# Patient Record
Sex: FEMALE | Race: WHITE | NOT HISPANIC OR LATINO | Employment: OTHER | URBAN - METROPOLITAN AREA
[De-identification: names, ages, dates, MRNs, and addresses within clinical notes are randomized per-mention and may not be internally consistent; named-entity substitution may affect disease eponyms.]

---

## 2017-05-03 ENCOUNTER — TRANSFERRED RECORDS (OUTPATIENT)
Dept: HEALTH INFORMATION MANAGEMENT | Facility: CLINIC | Age: 77
End: 2017-05-03

## 2017-05-10 DIAGNOSIS — K21.00 REFLUX ESOPHAGITIS: ICD-10-CM

## 2017-05-10 NOTE — TELEPHONE ENCOUNTER
omeprazole (PRILOSEC) 20 MG capsule 90 capsule 1 11/22/2016  No   Sig: Take 1 capsule (20 mg) by mouth daily           Last Written Prescription Date: 11/22/2016  Last Fill Quantity: 90,  # refills: 1   Last Office Visit with [facility], [facility] or [facility] prescribing provider: 05/17/2016 , no future appt.  I did not send letter.

## 2017-05-10 NOTE — TELEPHONE ENCOUNTER
Routing to TC pool, Pt is due for OV with PCP for annual physical with labs. Please assist them in scheduling this, then route back to refill pool. Thanks.     Cori Thompson, RN

## 2017-05-24 NOTE — TELEPHONE ENCOUNTER
Prescription approved per [facility] Refill Protocol.  Esperanza Corrales RN

## 2017-06-20 ENCOUNTER — TELEPHONE (OUTPATIENT)
Dept: FAMILY MEDICINE | Facility: CLINIC | Age: 77
End: 2017-06-20

## 2017-06-20 NOTE — TELEPHONE ENCOUNTER
Reason for Call:  Other     Detailed comments: Patient wants to go get a shot at pharmacy for pneumonia,   but they are asking which one.  Also questions about shingles shot    Phone Number Patient can be reached at: Home number on file [phone] (home)    Best Time: anytime    Can we leave a detailed message on this number? YES    Call taken on 6/20/2017 at 10:52 AM by Monica Elder

## 2017-09-05 ENCOUNTER — TRANSFERRED RECORDS (OUTPATIENT)
Dept: HEALTH INFORMATION MANAGEMENT | Facility: CLINIC | Age: 77
End: 2017-09-05

## 2017-09-26 ENCOUNTER — TRANSFERRED RECORDS (OUTPATIENT)
Dept: HEALTH INFORMATION MANAGEMENT | Facility: CLINIC | Age: 77
End: 2017-09-26

## 2017-11-02 ENCOUNTER — TRANSFERRED RECORDS (OUTPATIENT)
Dept: HEALTH INFORMATION MANAGEMENT | Facility: CLINIC | Age: 77
End: 2017-11-02

## 2017-11-02 LAB
CHOLEST SERPL-MCNC: 141 MG/DL (ref 0–200)
HDLC SERPL-MCNC: 72 MG/DL (ref 40–60)
LDLC SERPL CALC-MCNC: 44 MG/DL
TRIGL SERPL-MCNC: 127 MG/DL

## 2017-11-03 ENCOUNTER — DOCUMENTATION ONLY (OUTPATIENT)
Dept: LAB | Facility: CLINIC | Age: 77
End: 2017-11-03

## 2017-11-03 DIAGNOSIS — E78.5 HYPERLIPIDEMIA LDL GOAL <100: ICD-10-CM

## 2017-11-03 DIAGNOSIS — Z00.00 ROUTINE GENERAL MEDICAL EXAMINATION AT A HEALTH CARE FACILITY: Primary | ICD-10-CM

## 2017-11-03 DIAGNOSIS — I25.10 ASCVD (ARTERIOSCLEROTIC CARDIOVASCULAR DISEASE): ICD-10-CM

## 2017-11-03 DIAGNOSIS — R73.9 ELEVATED BLOOD SUGAR: ICD-10-CM

## 2017-11-03 NOTE — PROGRESS NOTES
"Patient is coming in for px labs on 11/10/17. Appointment notes state it is for \"fasting labs and A1c but no cholesterol\". Please review and place future labs that are needed at that time.     Thank you.  "

## 2017-11-08 DIAGNOSIS — R73.9 ELEVATED BLOOD SUGAR: ICD-10-CM

## 2017-11-08 DIAGNOSIS — I25.10 ASCVD (ARTERIOSCLEROTIC CARDIOVASCULAR DISEASE): ICD-10-CM

## 2017-11-08 DIAGNOSIS — K21.00 REFLUX ESOPHAGITIS: ICD-10-CM

## 2017-11-08 DIAGNOSIS — E78.5 HYPERLIPIDEMIA LDL GOAL <100: ICD-10-CM

## 2017-11-08 DIAGNOSIS — Z00.00 ROUTINE GENERAL MEDICAL EXAMINATION AT A HEALTH CARE FACILITY: ICD-10-CM

## 2017-11-08 LAB
ALBUMIN SERPL-MCNC: 4.3 G/DL (ref 3.4–5)
ALP SERPL-CCNC: 90 U/L (ref 40–150)
ALT SERPL W P-5'-P-CCNC: 24 U/L (ref 0–50)
ANION GAP SERPL CALCULATED.3IONS-SCNC: 12 MMOL/L (ref 3–14)
AST SERPL W P-5'-P-CCNC: 22 U/L (ref 0–45)
BILIRUB SERPL-MCNC: 0.6 MG/DL (ref 0.2–1.3)
BUN SERPL-MCNC: 17 MG/DL (ref 7–30)
CALCIUM SERPL-MCNC: 9.7 MG/DL (ref 8.5–10.1)
CHLORIDE SERPL-SCNC: 105 MMOL/L (ref 94–109)
CO2 SERPL-SCNC: 24 MMOL/L (ref 20–32)
CREAT SERPL-MCNC: 0.9 MG/DL (ref 0.52–1.04)
ERYTHROCYTE [DISTWIDTH] IN BLOOD BY AUTOMATED COUNT: 13.4 % (ref 10–15)
GFR SERPL CREATININE-BSD FRML MDRD: 61 ML/MIN/1.7M2
GLUCOSE SERPL-MCNC: 101 MG/DL (ref 70–99)
HBA1C MFR BLD: 5.5 % (ref 4.3–6)
HCT VFR BLD AUTO: 45.2 % (ref 35–47)
HGB BLD-MCNC: 14.9 G/DL (ref 11.7–15.7)
MCH RBC QN AUTO: 32.8 PG (ref 26.5–33)
MCHC RBC AUTO-ENTMCNC: 33 G/DL (ref 31.5–36.5)
MCV RBC AUTO: 100 FL (ref 78–100)
PLATELET # BLD AUTO: 200 10E9/L (ref 150–450)
POTASSIUM SERPL-SCNC: 4.1 MMOL/L (ref 3.4–5.3)
PROT SERPL-MCNC: 7.7 G/DL (ref 6.8–8.8)
RBC # BLD AUTO: 4.54 10E12/L (ref 3.8–5.2)
SODIUM SERPL-SCNC: 141 MMOL/L (ref 133–144)
WBC # BLD AUTO: 4.3 10E9/L (ref 4–11)

## 2017-11-08 PROCEDURE — 85027 COMPLETE CBC AUTOMATED: CPT | Performed by: INTERNAL MEDICINE

## 2017-11-08 PROCEDURE — 83036 HEMOGLOBIN GLYCOSYLATED A1C: CPT | Performed by: INTERNAL MEDICINE

## 2017-11-08 PROCEDURE — 36415 COLL VENOUS BLD VENIPUNCTURE: CPT | Performed by: INTERNAL MEDICINE

## 2017-11-08 PROCEDURE — 80053 COMPREHEN METABOLIC PANEL: CPT | Performed by: INTERNAL MEDICINE

## 2017-11-16 ENCOUNTER — TRANSFERRED RECORDS (OUTPATIENT)
Dept: HEALTH INFORMATION MANAGEMENT | Facility: CLINIC | Age: 77
End: 2017-11-16

## 2017-11-20 NOTE — PROGRESS NOTES
SUBJECTIVE:   Estefania Meyer is a 77 year old female who presents for Preventive Visit.    The patient is doing super, works out reg, just seen by cards and est neg per patient, lipids super as noted.  Up to date with gyn, doing dexa in [place].  No c/o               Past Medical History:      Past Medical History:   Diagnosis Date     ASCVD (arteriosclerotic cardiovascular disease) 2010    atyp cp then angio done anw and 4 stents     Elevated blood sugar      H/O colonoscopy 2005, 2013    nl     Microhematuria 2014    Dr. Rico, cysto and renal us neg     Osteoporosis 2001    by dexa, fu done 2004 and unchanged, fu done [place]     Reflux esophagitis 2004    on ugi, then egd 2009 with hh and esophagitis     Tremor     right hand for years             Past Surgical History:      Past Surgical History:   Procedure Laterality Date     CATARACT IOL, RT/LT  2016     COSMETIC SURGERY  1994    chin     EXTERNAL EAR SURGERY      young     left arm surgery Left 2014     MASTECTOMY SIMPLE BILATERAL  1982    done due to fh, had leakage and replaced 2004     TUBAL LIGATION  1972             Social History:     Social History     Social History     Marital status:      Spouse name: N/A     Number of children: 3     Years of education: N/A     Occupational History     Not on file.     Social History Main Topics     Smoking status: Never Smoker     Smokeless tobacco: Never Used     Alcohol use No     Drug use: No     Sexual activity: Yes     Partners: Male     Other Topics Concern     Not on file     Social History Narrative             Family History:   reviewed         Allergies:     Allergies   Allergen Reactions     Codeine Sulfate Nausea     Penicillin V Nausea and Vomiting     Sulfa Drugs Nausea and Vomiting             Medications:     Current Outpatient Prescriptions   Medication Sig Dispense Refill     omeprazole (PRILOSEC) 20 MG CR capsule TAKE 1 CAPSULE BY MOUTH  DAILY 90 capsule 3     ranitidine (ZANTAC)  150 MG tablet Take 1 tablet (150 mg) by mouth 2 times daily 180 tablet 11     D3-50 17566 UNITS capsule TAKE 1 CAPSULE BY MOUTH EVERY 30 DAYS. 12 capsule 1     rosuvastatin (CRESTOR) 5 MG tablet Take by mouth daily       aspirin 325 MG tablet Take by mouth daily                 Review of Systems:   The 10 point Review of Systems is negative other than noted in the HPI           Physical Exam:   Blood pressure 136/82, pulse 87, temperature 97.6  F (36.4  C), temperature source Oral, height 5' (1.524 m), weight 120 lb 14.4 oz (54.8 kg), SpO2 100 %, not currently breastfeeding.    Exam:  Constitutional: healthy appearing, alert and in no distress  Heent: Normocephalic. Head without obvious masses or lesions. PERRLDC, EOMI. Mouth exam within normal limits: tongue, mucous membranes, posterior pharynx all normal, no lesions or abnormalities seen.  Tm's and canals within normal limits bilaterally. Neck supple, no nuchal rigidity or masses. No supraclavicular, or cervical adenopathy. Thyroid symmetric, no masses.  Cardiovascular: Regular rate and rhythm, no murmer, rub or gallops.  JVP not elevated, no edema.  Carotids within normal limits bilaterally, no bruits.  Respiratory: Normal respiratory effort.  Lungs clear, normal flow, no wheezing or crackles.  Gastrointestinal: Normal active bowel sounds.   Soft, not tender, no masses, guarding or rebound.  No hepatosplenomegaly.   Musculoskeletal: extremities normal, no gross deformities noted.  Skin: no suspicious lesions or rashes   Neurologic: Mental status within normal limits.  Speech fluent.  No gross motor abnormalities and gait intact.  Psychiatric: mentation appears normal and affect normal.         Data:   Labs reviewed with patient         Assessment:   1. Normal complete physical exam  2. Ascvd, stable  3. Gerd, to try zantac, if not effective back to ppi  4. Elevated cholesterol on statin  5. Osteopor, follow up in [place]  6. Elevated sugar, stable  7. hcm          Plan:   prevnar  Exercise, diet  Call if problems      Boston Majano M.D.                    Are you in the first 12 months of your Medicare Part B coverage?  No    Healthy Habits:    Do you get at least three servings of calcium containing foods daily (dairy, green leafy vegetables, etc.)? no, taking calcium and/or vitamin D supplement: yes -     Amount of exercise or daily activities, outside of work: 5 day(s) per week    Problems taking medications regularly No    Medication side effects: No    Have you had an eye exam in the past two years? yes    Do you see a dentist twice per year? yes    Do you have sleep apnea, excessive snoring or daytime drowsiness?no                Reviewed and updated as needed this visit by clinical staff         Reviewed and updated as needed this visit by Provider        Social History   Substance Use Topics     Smoking status: Never Smoker     Smokeless tobacco: Never Used     Alcohol use No       The patient does not drink >3 drinks per day nor >7 drinks per week.     Today's PHQ-2 Score:   PHQ-2 ( 1999 Pfizer) 5/17/2016 11/12/2013   Q1: Little interest or pleasure in doing things 0 0   Q2: Feeling down, depressed or hopeless 0 0   PHQ-2 Score 0 0         Do you feel safe in your environment - Yes    Do you have a Health Care Directive?: Yes: Patient states has Advance Directive and will bring in a copy to clinic.    Current providers sharing in care for this patient include: Patient Care Team:  Boston Majano MD as PCP - General (Internal Medicine)      Hearing impairment: No    Ability to successfully perform activities of daily living: Yes, no assistance needed     Fall risk:         Home safety:  none identified      The following health maintenance items are reviewed in Epic and correct as of today:Health Maintenance   Topic Date Due     ADVANCE DIRECTIVE PLANNING Q5 YRS  07/02/1997     DEXA SCAN SCREENING (SYSTEM ASSIGNED)  07/02/2007     PNEUMOCOCCAL (2 of 2 - PCV13)  09/29/2010     FALL RISK ASSESSMENT  05/17/2017     INFLUENZA VACCINE (SYSTEM ASSIGNED)  09/01/2017     LIPID SCREEN Q5 YR FEMALE (SYSTEM ASSIGNED)  10/18/2020     TETANUS IMMUNIZATION (SYSTEM ASSIGNED)  11/12/2023     COLON CANCER SCREEN (SYSTEM ASSIGNED)  11/21/2023             End of Life Planning:  Patient currently has an advanced directive: yes    COUNSELING:  Reviewed preventive health counseling, as reflected in patient instructions       Regular exercise       Healthy diet/nutrition        Estimated body mass index is 24.9 kg/(m^2) as calculated from the following:    Height as of 5/17/16: 5' (1.524 m).    Weight as of 5/17/16: 127 lb 8 oz (57.8 kg).     reports that she has never smoked. She has never used smokeless tobacco.        Appropriate preventive services were discussed with this patient, including applicable screening as appropriate for cardiovascular disease, diabetes, osteopenia/osteoporosis, and glaucoma.  As appropriate for age/gender, discussed screening for colorectal cancer, prostate cancer, breast cancer, and cervical cancer. Checklist reviewing preventive services available has been given to the patient.    Reviewed patients plan of care and provided an AVS. The Basic Care Plan (routine screening as documented in Health Maintenance) for Estefania meets the Care Plan requirement. This Care Plan has been established and reviewed with the Patient.    Counseling Resources:  ATP IV Guidelines  Pooled Cohorts Equation Calculator  Breast Cancer Risk Calculator  FRAX Risk Assessment  ICSI Preventive Guidelines  Dietary Guidelines for Americans, 2010  USDA's MyPlate  ASA Prophylaxis  Lung CA Screening    Boston Majano MD  [facility]

## 2017-11-21 ENCOUNTER — OFFICE VISIT (OUTPATIENT)
Dept: FAMILY MEDICINE | Facility: CLINIC | Age: 77
End: 2017-11-21
Payer: MEDICARE

## 2017-11-21 VITALS
DIASTOLIC BLOOD PRESSURE: 82 MMHG | BODY MASS INDEX: 23.74 KG/M2 | HEIGHT: 60 IN | HEART RATE: 87 BPM | OXYGEN SATURATION: 100 % | SYSTOLIC BLOOD PRESSURE: 136 MMHG | TEMPERATURE: 97.6 F | WEIGHT: 120.9 LBS

## 2017-11-21 DIAGNOSIS — K21.00 REFLUX ESOPHAGITIS: ICD-10-CM

## 2017-11-21 DIAGNOSIS — R73.9 ELEVATED BLOOD SUGAR: ICD-10-CM

## 2017-11-21 DIAGNOSIS — M81.0 OSTEOPOROSIS, UNSPECIFIED OSTEOPOROSIS TYPE, UNSPECIFIED PATHOLOGICAL FRACTURE PRESENCE: ICD-10-CM

## 2017-11-21 DIAGNOSIS — Z23 NEED FOR VACCINATION: ICD-10-CM

## 2017-11-21 DIAGNOSIS — E78.5 HYPERLIPIDEMIA LDL GOAL <100: ICD-10-CM

## 2017-11-21 DIAGNOSIS — I25.10 ASCVD (ARTERIOSCLEROTIC CARDIOVASCULAR DISEASE): ICD-10-CM

## 2017-11-21 DIAGNOSIS — Z00.00 ROUTINE GENERAL MEDICAL EXAMINATION AT A HEALTH CARE FACILITY: Primary | ICD-10-CM

## 2017-11-21 PROCEDURE — G0439 PPPS, SUBSEQ VISIT: HCPCS | Performed by: INTERNAL MEDICINE

## 2017-11-21 PROCEDURE — 90670 PCV13 VACCINE IM: CPT | Performed by: INTERNAL MEDICINE

## 2017-11-21 PROCEDURE — G0009 ADMIN PNEUMOCOCCAL VACCINE: HCPCS | Performed by: INTERNAL MEDICINE

## 2017-11-21 NOTE — NURSING NOTE
Screening Questionnaire for Adult Immunization    Are you sick today?   No   Do you have allergies to medications, food, a vaccine component or latex?   Yes   Have you ever had a serious reaction after receiving a vaccination?   No   Do you have a long-term health problem with heart disease, lung disease, asthma, kidney disease, metabolic disease (e.g. diabetes), anemia, or other blood disorder?   Yes   Do you have cancer, leukemia, HIV/AIDS, or any other immune system problem?   No   In the past 3 months, have you taken medications that affect  your immune system, such as prednisone, other steroids, or anticancer drugs; drugs for the treatment of rheumatoid arthritis, Crohn s disease, or psoriasis; or have you had radiation treatments?   No   Have you had a seizure, or a brain or other nervous system problem?   No   During the past year, have you received a transfusion of blood or blood     products, or been given immune (gamma) globulin or antiviral drug?   No   For women: Are you pregnant or is there a chance you could become        pregnant during the next month?   No   Have you received any vaccinations in the past 4 weeks?   No     Immunization questionnaire was positive for at least one answer.  Notified pcp.        Per orders of Dr. Majano, injection of prevnar given by Nathalie Hayes. Patient instructed to remain in clinic for 15 minutes afterwards, and to report any adverse reaction to me immediately.    Prior to injection verified patient identity using patient's name and date of birth.     Screening performed by Nathalie Hayes on 11/21/2017 at 9:45 AM.

## 2017-11-21 NOTE — MR AVS SNAPSHOT
After Visit Summary   11/21/2017    Estefania Meyer    MRN: [id number]           Patient Information     Date Of Birth          1940        Visit Information        Provider Department      11/21/2017 9:30 AM Boston Majano MD [facility]        Today's Diagnoses     Routine general medical examination at a health care facility    -  1    Reflux esophagitis        ASCVD (arteriosclerotic cardiovascular disease)        Hyperlipidemia LDL goal <100        Osteoporosis, unspecified osteoporosis type, unspecified pathological fracture presence          Care Instructions      Preventive Health Recommendations    Female Ages 65 +    Yearly exam:     See your health care provider every year in order to  o Review health changes.   o Discuss preventive care.    o Review your medicines if your doctor has prescribed any.      You no longer need a yearly Pap test unless you've had an abnormal Pap test in the past 10 years. If you have vaginal symptoms, such as bleeding or discharge, be sure to talk with your provider about a Pap test.      Every 1 to 2 years, have a mammogram.  If you are over 69, talk with your health care provider about whether or not you want to continue having screening mammograms.      Every 10 years, have a colonoscopy. Or, have a yearly FIT test (stool test). These exams will check for colon cancer.       Have a cholesterol test every 5 years, or more often if your doctor advises it.       Have a diabetes test (fasting glucose) every three years. If you are at risk for diabetes, you should have this test more often.       At age 65, have a bone density scan (DEXA) to check for osteoporosis (brittle bone disease).    Shots:    Get a flu shot each year.    Get a tetanus shot every 10 years.    Talk to your doctor about your pneumonia vaccines. There are now two you should receive - Pneumovax (PPSV 23) and Prevnar (PCV 13).    Talk to your doctor about the shingles  "vaccine.    Talk to your doctor about the hepatitis B vaccine.    Nutrition:     Eat at least 5 servings of fruits and vegetables each day.      Eat whole-grain bread, whole-wheat pasta and brown rice instead of white grains and rice.      Talk to your provider about Calcium and Vitamin D.     Lifestyle    Exercise at least 150 minutes a week (30 minutes a day, 5 days a week). This will help you control your weight and prevent disease.      Limit alcohol to one drink per day.      No smoking.       Wear sunscreen to prevent skin cancer.       See your dentist twice a year for an exam and cleaning.      See your eye doctor every 1 to 2 years to screen for conditions such as glaucoma, macular degeneration and cataracts.          Follow-ups after your visit        Who to contact     If you have questions or need follow up information about today's clinic visit or your schedule please contact [facility] directly at [phone].  Normal or non-critical lab and imaging results will be communicated to you by MyChart, letter or phone within 4 business days after the clinic has received the results. If you do not hear from us within 7 days, please contact the clinic through [employer] or phone. If you have a critical or abnormal lab result, we will notify you by phone as soon as possible.  Submit refill requests through [facility] or call your pharmacy and they will forward the refill request to us. Please allow 3 business days for your refill to be completed.          Additional Information About Your Visit        [employer] Information     [facility] lets you send messages to your doctor, view your test results, renew your prescriptions, schedule appointments and more. To sign up, go to [email or web address] . Click on \"Log in\" on the left side of the screen, which will take you to the Welcome page. Then click on \"Sign up Now\" on the right side of the page.     You will be asked to enter the access code listed below, " as well as some personal information. Please follow the directions to create your username and password.     Your access code is: [email or web address]  Expires: 2018  9:40 AM     Your access code will  in 90 days. If you need help or a new code, please call your [place] clinic or [phone].        Care EveryWhere ID     This is your Care EveryWhere ID. This could be used by other organizations to access your [place] medical records  [id number]        Your Vitals Were     Pulse Temperature Height Pulse Oximetry Breastfeeding? BMI (Body Mass Index)    87 97.6  F (36.4  C) (Oral) 5' (1.524 m) 100% No 23.61 kg/m2       Blood Pressure from Last 3 Encounters:   17 136/82   16 136/84   13 134/86    Weight from Last 3 Encounters:   17 120 lb 14.4 oz (54.8 kg)   16 127 lb 8 oz (57.8 kg)   13 123 lb (55.8 kg)              We Performed the Following     PNEUMOCOCCAL CONJ VACCINE 13 VALENT IM          Today's Medication Changes          These changes are accurate as of: 17  9:40 AM.  If you have any questions, ask your nurse or doctor.               Start taking these medicines.        Dose/Directions    ranitidine 150 MG tablet   Commonly known as:  ZANTAC   Used for:  Reflux esophagitis   Started by:  Boston Majano MD        Dose:  150 mg   Take 1 tablet (150 mg) by mouth 2 times daily   Quantity:  180 tablet   Refills:  11         These medicines have changed or have updated prescriptions.        Dose/Directions    omeprazole 20 MG CR capsule   Commonly known as:  priLOSEC   This may have changed:  See the new instructions.   Used for:  Reflux esophagitis   Changed by:  Boston Majano MD        TAKE 1 CAPSULE BY MOUTH  DAILY   Quantity:  90 capsule   Refills:  3            Where to get your medicines      These medications were sent to [facility] - [address] [facility] [[email or web address]] [address]     Phone:   [phone]     omeprazole 20 MG CR capsule    ranitidine 150 MG tablet                Primary Care Provider Office Phone # Fax #    Boston Majano [phone] [address] [address]        Equal Access to Services     [facility] UMANG : Hadii aad ku hadasho Soomaali, waaxda luqadaha, qaybta kaalmada adeegyada, waxay juwanin haywarrenn rema diazvitaliyjluis guillen . So [facility] [phone].    ATENCIÓN: Si habla español, tiene a raman disposición servicios gratuitos de asistencia lingüística. [facility] [phone].    We comply with applicable federal civil rights laws and [place] laws. We do not discriminate on the basis of race, color, national origin, age, disability, sex, sexual orientation, or gender identity.            Thank you!     Thank you for choosing [facility]  for your care. Our goal is always to provide you with excellent care. Hearing back from our patients is one way we can continue to improve our services. Please take a few minutes to complete the written survey that you may receive in the mail after your visit with us. Thank you!             Your Updated Medication List - Protect others around you: Learn how to safely use, store and throw away your medicines at www.disposemymeds.org.          This list is accurate as of: 11/21/17  9:40 AM.  Always use your most recent med list.                   Brand Name Dispense Instructions for use Diagnosis    aspirin 325 MG tablet      Take by mouth daily        CRESTOR 5 MG tablet   Generic drug:  rosuvastatin      Take by mouth daily        D3-50 40001 UNITS capsule   Generic drug:  cholecalciferol     12 capsule    TAKE 1 CAPSULE BY MOUTH EVERY 30 DAYS.    Osteoporosis, unspecified osteoporosis type, unspecified pathological fracture presence       omeprazole 20 MG CR capsule    priLOSEC    90 capsule    TAKE 1 CAPSULE BY MOUTH  DAILY    Reflux esophagitis       ranitidine 150 MG tablet    ZANTAC    180 tablet     Take 1 tablet (150 mg) by mouth 2 times daily    Reflux esophagitis

## 2017-11-21 NOTE — NURSING NOTE
Chief Complaint   Patient presents with     Medicare Visit       Initial /85 (BP Location: Left arm, Patient Position: Chair, Cuff Size: Adult Regular)  Pulse 87  Temp 97.6  F (36.4  C) (Oral)  Ht 5' (1.524 m)  Wt 120 lb 14.4 oz (54.8 kg)  SpO2 100%  Breastfeeding? No  BMI 23.61 kg/m2 Estimated body mass index is 23.61 kg/(m^2) as calculated from the following:    Height as of this encounter: 5' (1.524 m).    Weight as of this encounter: 120 lb 14.4 oz (54.8 kg).  Medication Reconciliation: complete.  Kathy Hayes, CMA

## 2018-05-10 ENCOUNTER — TELEPHONE (OUTPATIENT)
Dept: FAMILY MEDICINE | Facility: CLINIC | Age: 78
End: 2018-05-10

## 2018-05-10 DIAGNOSIS — M81.0 OSTEOPOROSIS, UNSPECIFIED OSTEOPOROSIS TYPE, UNSPECIFIED PATHOLOGICAL FRACTURE PRESENCE: ICD-10-CM

## 2018-05-23 DIAGNOSIS — M81.0 OSTEOPOROSIS, UNSPECIFIED OSTEOPOROSIS TYPE, UNSPECIFIED PATHOLOGICAL FRACTURE PRESENCE: ICD-10-CM

## 2018-05-23 NOTE — TELEPHONE ENCOUNTER
Reason for Call:  Medication or medication refill:    Do you use a [facility]?  Name of the pharmacy and phone number for the current request:         [facility] [id number] [place] [facility] [place] [address]      Name of the medication requested:   cholecalciferol (D3-50) 40545 units capsule 12 capsule         Other request:   Original order was sent to [facility], which pt did not fill bc they don't do that type of Rx.  She is requesting that it be sent to [facility].  Please resend Rx.      Can we leave a detailed message on this number? YES    Phone number patient can be reached at: Home number on file [phone] (home)    Best Time: Any     Call taken on 5/23/2018 at 10:12 AM by Flavia Hernandez

## 2018-09-06 ENCOUNTER — TRANSFERRED RECORDS (OUTPATIENT)
Dept: HEALTH INFORMATION MANAGEMENT | Facility: CLINIC | Age: 78
End: 2018-09-06

## 2018-09-12 ENCOUNTER — TRANSFERRED RECORDS (OUTPATIENT)
Dept: HEALTH INFORMATION MANAGEMENT | Facility: CLINIC | Age: 78
End: 2018-09-12

## 2018-09-13 ENCOUNTER — TRANSFERRED RECORDS (OUTPATIENT)
Dept: HEALTH INFORMATION MANAGEMENT | Facility: CLINIC | Age: 78
End: 2018-09-13

## 2018-10-04 ENCOUNTER — TRANSFERRED RECORDS (OUTPATIENT)
Dept: HEALTH INFORMATION MANAGEMENT | Facility: CLINIC | Age: 78
End: 2018-10-04

## 2018-10-24 ENCOUNTER — TRANSFERRED RECORDS (OUTPATIENT)
Dept: HEALTH INFORMATION MANAGEMENT | Facility: CLINIC | Age: 78
End: 2018-10-24

## 2018-10-29 ENCOUNTER — TRANSFERRED RECORDS (OUTPATIENT)
Dept: HEALTH INFORMATION MANAGEMENT | Facility: CLINIC | Age: 78
End: 2018-10-29

## 2018-11-09 DIAGNOSIS — K21.00 REFLUX ESOPHAGITIS: ICD-10-CM

## 2018-11-09 NOTE — TELEPHONE ENCOUNTER
"omeprazole (PRILOSEC) 20 MG CR capsule 90 capsule 3 11/21/2017     Last Written Prescription Date:  11/21/17  Last Fill Quantity: 90,  # refills: 3   Last office visit: 11/21/2017 with prescribing provider:  Gretel   Future Office Visit: none   Requested Prescriptions   Pending Prescriptions Disp Refills     omeprazole (PRILOSEC) 20 MG CR capsule [Pharmacy Med Name: OMEPRAZOLE  20MG  CAP] 90 capsule      Sig: TAKE 1 CAPSULE BY MOUTH  DAILY    PPI Protocol Failed    11/9/2018  3:28 AM       Failed - No diagnosis of osteoporosis on record       Passed - Not on Clopidogrel (unless Pantoprazole ordered)       Passed - Recent (12 mo) or future (30 days) visit within the authorizing provider's specialty    Patient had office visit in the last 12 months or has a visit in the next 30 days with authorizing provider or within the authorizing provider's specialty.  See \"Patient Info\" tab in inbasket, or \"Choose Columns\" in Meds & Orders section of the refill encounter.             Passed - Patient is age 18 or older       Passed - No active pregnacy on record       Passed - No positive pregnancy test in past 12 months      No flowsheet data found.    "

## 2018-11-09 NOTE — TELEPHONE ENCOUNTER
Prescription approved per [facility] Refill Protocol.  Carmen AKINS RN,BSN

## 2019-01-29 DIAGNOSIS — K21.00 REFLUX ESOPHAGITIS: ICD-10-CM

## 2019-01-29 NOTE — TELEPHONE ENCOUNTER
"omeprazole (PRILOSEC) 20 MG CR capsule 90 capsule 0 11/9/2018     Last Written Prescription Date:  11/9/18  Last Fill Quantity: 90,  # refills: 0   Last office visit: 11/21/2017 with prescribing provider:  Gretel   Future Office Visit:  None    Requested Prescriptions   Pending Prescriptions Disp Refills     omeprazole (PRILOSEC) 20 MG DR capsule [Pharmacy Med Name: OMEPRAZOLE  20MG  CAP] 90 capsule 0     Sig: TAKE 1 CAPSULE BY MOUTH  DAILY    PPI Protocol Failed - 1/29/2019  3:35 AM       Failed - No diagnosis of osteoporosis on record       Failed - Recent (12 mo) or future (30 days) visit within the authorizing provider's specialty    Patient had office visit in the last 12 months or has a visit in the next 30 days with authorizing provider or within the authorizing provider's specialty.  See \"Patient Info\" tab in inbasket, or \"Choose Columns\" in Meds & Orders section of the refill encounter.             Passed - Not on Clopidogrel (unless Pantoprazole ordered)       Passed - Medication is active on med list       Passed - Patient is age 18 or older       Passed - No active pregnacy on record       Passed - No positive pregnancy test in past 12 months        No flowsheet data found.      "

## 2019-01-29 NOTE — TELEPHONE ENCOUNTER
Routing refill request to provider for review/approval because:  Patient has osteoporosis on file.    ROSS Marroquin, RN  [facility]

## 2019-04-24 ENCOUNTER — TRANSFERRED RECORDS (OUTPATIENT)
Dept: HEALTH INFORMATION MANAGEMENT | Facility: CLINIC | Age: 79
End: 2019-04-24

## 2019-06-06 ENCOUNTER — TRANSFERRED RECORDS (OUTPATIENT)
Dept: HEALTH INFORMATION MANAGEMENT | Facility: CLINIC | Age: 79
End: 2019-06-06

## 2019-06-11 ENCOUNTER — TRANSFERRED RECORDS (OUTPATIENT)
Dept: HEALTH INFORMATION MANAGEMENT | Facility: CLINIC | Age: 79
End: 2019-06-11

## 2019-09-06 ENCOUNTER — TRANSFERRED RECORDS (OUTPATIENT)
Dept: HEALTH INFORMATION MANAGEMENT | Facility: CLINIC | Age: 79
End: 2019-09-06

## 2019-09-25 ENCOUNTER — TRANSFERRED RECORDS (OUTPATIENT)
Dept: HEALTH INFORMATION MANAGEMENT | Facility: CLINIC | Age: 79
End: 2019-09-25

## 2019-11-11 ENCOUNTER — TRANSFERRED RECORDS (OUTPATIENT)
Dept: HEALTH INFORMATION MANAGEMENT | Facility: CLINIC | Age: 79
End: 2019-11-11

## 2021-06-23 ENCOUNTER — TRANSFERRED RECORDS (OUTPATIENT)
Dept: HEALTH INFORMATION MANAGEMENT | Facility: CLINIC | Age: 81
End: 2021-06-23

## 2021-06-30 ENCOUNTER — TRANSFERRED RECORDS (OUTPATIENT)
Dept: HEALTH INFORMATION MANAGEMENT | Facility: CLINIC | Age: 81
End: 2021-06-30

## 2021-07-08 DIAGNOSIS — I25.10 ASCVD (ARTERIOSCLEROTIC CARDIOVASCULAR DISEASE): ICD-10-CM

## 2021-07-08 DIAGNOSIS — Z00.00 MEDICARE ANNUAL WELLNESS VISIT, SUBSEQUENT: ICD-10-CM

## 2021-07-08 DIAGNOSIS — R73.9 ELEVATED BLOOD SUGAR: ICD-10-CM

## 2021-07-08 LAB
ALBUMIN SERPL-MCNC: 4.2 G/DL (ref 3.4–5)
ALP SERPL-CCNC: 100 U/L (ref 40–150)
ALT SERPL W P-5'-P-CCNC: 28 U/L (ref 0–50)
ANION GAP SERPL CALCULATED.3IONS-SCNC: 6 MMOL/L (ref 3–14)
AST SERPL W P-5'-P-CCNC: 22 U/L (ref 0–45)
BILIRUB SERPL-MCNC: 0.5 MG/DL (ref 0.2–1.3)
BUN SERPL-MCNC: 20 MG/DL (ref 7–30)
CALCIUM SERPL-MCNC: 9.8 MG/DL (ref 8.5–10.1)
CHLORIDE SERPL-SCNC: 108 MMOL/L (ref 94–109)
CO2 SERPL-SCNC: 27 MMOL/L (ref 20–32)
CREAT SERPL-MCNC: 1.12 MG/DL (ref 0.52–1.04)
ERYTHROCYTE [DISTWIDTH] IN BLOOD BY AUTOMATED COUNT: 13.3 % (ref 10–15)
GFR SERPL CREATININE-BSD FRML MDRD: 47 ML/MIN/{1.73_M2}
GLUCOSE SERPL-MCNC: 107 MG/DL (ref 70–99)
HBA1C MFR BLD: 5.5 % (ref 0–5.6)
HCT VFR BLD AUTO: 45 % (ref 35–47)
HGB BLD-MCNC: 15.2 G/DL (ref 11.7–15.7)
MCH RBC QN AUTO: 33.3 PG (ref 26.5–33)
MCHC RBC AUTO-ENTMCNC: 33.8 G/DL (ref 31.5–36.5)
MCV RBC AUTO: 99 FL (ref 78–100)
PLATELET # BLD AUTO: 208 10E9/L (ref 150–450)
POTASSIUM SERPL-SCNC: 4.3 MMOL/L (ref 3.4–5.3)
PROT SERPL-MCNC: 7.6 G/DL (ref 6.8–8.8)
RBC # BLD AUTO: 4.56 10E12/L (ref 3.8–5.2)
SODIUM SERPL-SCNC: 141 MMOL/L (ref 133–144)
WBC # BLD AUTO: 7.6 10E9/L (ref 4–11)

## 2021-07-08 PROCEDURE — 36415 COLL VENOUS BLD VENIPUNCTURE: CPT | Performed by: INTERNAL MEDICINE

## 2021-07-08 PROCEDURE — 83036 HEMOGLOBIN GLYCOSYLATED A1C: CPT | Performed by: INTERNAL MEDICINE

## 2021-07-08 PROCEDURE — 80053 COMPREHEN METABOLIC PANEL: CPT | Performed by: INTERNAL MEDICINE

## 2021-07-08 PROCEDURE — 85027 COMPLETE CBC AUTOMATED: CPT | Performed by: INTERNAL MEDICINE

## 2021-07-14 NOTE — PROGRESS NOTES
SUBJECTIVE:   Estefania Meyer is a 81 year old female who presents for Preventive Visit.    She is doing super.  She works out regularly.  She will be seeing cardiology next week.  She is up-to-date with her gynecologist and had a bone density test.    For 6 months she has noticed a funny feeling on the top of her head off and on.  There is no pain.  There is no rashes.  It comes and goes.  Neurology was not sure what it is.  She has no other complaints.               Past Medical History:      Past Medical History:   Diagnosis Date     ASCVD (arteriosclerotic cardiovascular disease) 01/01/2010    atyp cp then angio done anw and 4 stents     Elevated blood sugar      H/O colonoscopy 2005, 2013    nl; done [place] 4/19 and nl     Microhematuria 01/01/2014    Dr. Rico, cysto and renal us neg     Osteoporosis 01/01/2001    by dexa, fu done 2004 and unchanged, fu done [place]     Reflux esophagitis 01/01/2004    on ugi, then egd 2009 with hh and esophagitis; fu done 4/19 in [place]     Tremor     right hand for years             Past Surgical History:      Past Surgical History:   Procedure Laterality Date     CATARACT IOL, RT/LT  2016     COSMETIC SURGERY  1994    chin     EXTERNAL EAR SURGERY      young     left arm surgery Left 2014     MASTECTOMY SIMPLE BILATERAL  1982    done due to fh, had leakage and replaced 2004     TUBAL LIGATION  1972             Social History:     Social History     Socioeconomic History     Marital status:      Spouse name: Not on file     Number of children: 3     Years of education: Not on file     Highest education level: Not on file   Occupational History     Not on file   Tobacco Use     Smoking status: Never Smoker     Smokeless tobacco: Never Used   Substance and Sexual Activity     Alcohol use: No     Alcohol/week: 0.0 standard drinks     Drug use: No     Sexual activity: Yes     Partners: Male   Other Topics Concern     Not on file   Social History Narrative     Not on  file     Social Determinants of Health     Financial Resource Strain:      Difficulty of Paying Living Expenses:    Food Insecurity:      Worried About Running Out of Food in the Last Year:      Ran Out of Food in the Last Year:    Transportation Needs:      Lack of Transportation (Medical):      Lack of Transportation (Non-Medical):    Physical Activity:      Days of Exercise per Week:      Minutes of Exercise per Session:    Stress:      Feeling of Stress :    Social Connections:      Frequency of Communication with Friends and Family:      Frequency of Social Gatherings with Friends and Family:      Attends Sikh Services:      Active Member of Clubs or Organizations:      Attends Club or Organization Meetings:      Marital Status:    Intimate Partner Violence:      Fear of Current or Ex-Partner:      Emotionally Abused:      Physically Abused:      Sexually Abused:              Family History:   reviewed         Allergies:     Allergies   Allergen Reactions     Codeine Sulfate Nausea     Penicillin V Nausea and Vomiting     Sulfa Drugs Nausea and Vomiting             Medications:     Current Outpatient Medications   Medication Sig Dispense Refill     aspirin 325 MG tablet Take 162 mg by mouth daily        omeprazole (PRILOSEC) 20 MG DR capsule TAKE 1 CAPSULE BY MOUTH  DAILY 90 capsule 0     rosuvastatin (CRESTOR) 5 MG tablet Take by mouth daily                 Review of Systems:   The 10 point Review of Systems is negative other than noted in the HPI           Physical Exam:   Blood pressure 136/86, pulse 85, temperature 98  F (36.7  C), temperature source Oral, height 1.524 m (5'), weight 58.3 kg (128 lb 8 oz), SpO2 99 %, not currently breastfeeding.    Exam:  Constitutional: healthy appearing, alert and in no distress  Heent: Normocephalic. Head without obvious masses or lesions. PERRLDC, EOMI. Mouth exam within normal limits: tongue, mucous membranes, posterior pharynx all normal, no lesions or  abnormalities seen.  Tm's and canals within normal limits bilaterally. Neck supple, no nuchal rigidity or masses. No supraclavicular, or cervical adenopathy. Thyroid symmetric, no masses.  Cardiovascular: Regular rate and rhythm, no murmer, rub or gallops.  JVP not elevated, no edema.  Carotids within normal limits bilaterally, no bruits.  Respiratory: Normal respiratory effort.  Lungs clear, normal flow, no wheezing or crackles.  Gastrointestinal: Normal active bowel sounds.   Soft, not tender, no masses, guarding or rebound.  No hepatosplenomegaly.   Musculoskeletal: extremities normal, no gross deformities noted.  Skin: no suspicious lesions or rashes and nothing top of head  Neurologic: Mental status within normal limits.  Speech fluent.  No gross motor abnormalities and gait intact.  Psychiatric: mentation appears normal and affect normal.         Data:   Labs reviewed with patient, lipids being done by cards        Assessment:   1. Normal complete physical exam  2. Cad, med mgmt  3. Osteopor, follow up gyn  4. Elevated cholesterol on statin  5. Elevated sugar, exercise, diet  6. Gerd, no issues  7. Feeling top of head, neg exam  8. hcm         Plan:   Up to date immunizations, colon  For the feeling on the top of her head for now will observe closely for changes worsen she will let me know.  Exercise, diet      Boston Majaon M.D.                Patient has been advised of split billing requirements and indicates understanding: Yes   Are you in the first 12 months of your Medicare coverage?  No    Healthy Habits:    In general, how would you rate your overall health?  Very good    Frequency of exercise:  2-3 days/week    Duration of exercise:  15-30 minutes    Taking medications regularly:  Yes    Barriers to taking medications:  None    Medication side effects:  None    Ability to successfully perform activities of daily living:  No assistance needed    Home Safety:  Lack of grab bars in the bathroom     Hearing Impairment:  No hearing concerns (hearing loss Left ear)    In the past 6 months, have you been bothered by leaking of urine?  No    In general, how would you rate your overall mental or emotional health?  Good      PHQ-2 Total Score:    Additional concerns today:  Yes (medication refill)    Do you feel safe in your environment? Yes    Have you ever done Advance Care Planning? (For example, a Health Directive, POLST, or a discussion with a medical provider or your loved ones about your wishes): Yes, patient states has an Advance Care Planning document and will bring a copy to the clinic.       Fall risk       Cognitive Screening   1) Repeat 3 items (Leader, Season, Table)    2) Clock draw: ABNORMAL hands  3) 3 item recall: Recalls 2 objects   Results: NORMAL clock, 1-2 items recalled: COGNITIVE IMPAIRMENT LESS LIKELY    Mini-CogTM Copyright S Carlin. Licensed by the author for use in [facility]; reprinted with permission (binu@[facility]). All rights reserved.      Do you have sleep apnea, excessive snoring or daytime drowsiness?: no    Reviewed and updated as needed this visit by clinical staff                 Reviewed and updated as needed this visit by Provider                Social History     Tobacco Use     Smoking status: Never Smoker     Smokeless tobacco: Never Used   Substance Use Topics     Alcohol use: No     Alcohol/week: 0.0 standard drinks     If you drink alcohol do you typically have >3 drinks per day or >7 drinks per week? No    Alcohol Use 11/21/2017   Prescreen: >3 drinks/day or >7 drinks/week? The patient does not drink >3 drinks per day nor >7 drinks per week.               Current providers sharing in care for this patient include:   Patient Care Team:  Boston Majano MD as PCP - General (Internal Medicine)    The following health maintenance items are reviewed in Epic and correct as of today:  Health Maintenance Due   Topic Date Due     DEXA  Never done     ANNUAL  REVIEW OF HM ORDERS  Never done     ADVANCE CARE PLANNING  Never done     HEPATITIS C SCREENING  Never done     MEDICARE ANNUAL WELLNESS VISIT  11/21/2018     FALL RISK ASSESSMENT  11/21/2018     PHQ-2  01/01/2021             Pertinent mammograms are reviewed under the imaging tab.    Review of Systems      OBJECTIVE:   There were no vitals taken for this visit. Estimated body mass index is 23.61 kg/m  as calculated from the following:    Height as of 11/21/17: 1.524 m (5').    Weight as of 11/21/17: 54.8 kg (120 lb 14.4 oz).  Physical Exam          ASSESSMENT / PLAN:       Patient has been advised of split billing requirements and indicates understanding: No  COUNSELING:  Reviewed preventive health counseling, as reflected in patient instructions       Regular exercise       Healthy diet/nutrition    Estimated body mass index is 23.61 kg/m  as calculated from the following:    Height as of 11/21/17: 1.524 m (5').    Weight as of 11/21/17: 54.8 kg (120 lb 14.4 oz).        She reports that she has never smoked. She has never used smokeless tobacco.      Appropriate preventive services were discussed with this patient, including applicable screening as appropriate for cardiovascular disease, diabetes, osteopenia/osteoporosis, and glaucoma.  As appropriate for age/gender, discussed screening for colorectal cancer, prostate cancer, breast cancer, and cervical cancer. Checklist reviewing preventive services available has been given to the patient.    Reviewed patients plan of care and provided an AVS. The Basic Care Plan (routine screening as documented in Health Maintenance) for Estefania meets the Care Plan requirement. This Care Plan has been established and reviewed with the Patient.    Counseling Resources:  ATP IV Guidelines  Pooled Cohorts Equation Calculator  Breast Cancer Risk Calculator  Breast Cancer: Medication to Reduce Risk  FRAX Risk Assessment  ICSI Preventive Guidelines  Dietary Guidelines for Americans,  2010  [employer]'s MyPlate  ASA Prophylaxis  Lung CA Screening    Boston Majano MD  [facility]    Identified Health Risks:

## 2021-07-15 ENCOUNTER — OFFICE VISIT (OUTPATIENT)
Dept: FAMILY MEDICINE | Facility: CLINIC | Age: 81
End: 2021-07-15
Payer: MEDICARE

## 2021-07-15 VITALS
OXYGEN SATURATION: 99 % | DIASTOLIC BLOOD PRESSURE: 86 MMHG | TEMPERATURE: 98 F | BODY MASS INDEX: 25.23 KG/M2 | WEIGHT: 128.5 LBS | SYSTOLIC BLOOD PRESSURE: 136 MMHG | HEIGHT: 60 IN | HEART RATE: 85 BPM

## 2021-07-15 DIAGNOSIS — R73.9 ELEVATED BLOOD SUGAR: ICD-10-CM

## 2021-07-15 DIAGNOSIS — I25.10 ASCVD (ARTERIOSCLEROTIC CARDIOVASCULAR DISEASE): ICD-10-CM

## 2021-07-15 DIAGNOSIS — E78.5 HYPERLIPIDEMIA LDL GOAL <100: ICD-10-CM

## 2021-07-15 DIAGNOSIS — Z00.00 MEDICARE ANNUAL WELLNESS VISIT, SUBSEQUENT: Primary | ICD-10-CM

## 2021-07-15 DIAGNOSIS — M81.0 OSTEOPOROSIS, UNSPECIFIED OSTEOPOROSIS TYPE, UNSPECIFIED PATHOLOGICAL FRACTURE PRESENCE: ICD-10-CM

## 2021-07-15 DIAGNOSIS — K21.00 GASTROESOPHAGEAL REFLUX DISEASE WITH ESOPHAGITIS, UNSPECIFIED WHETHER HEMORRHAGE: ICD-10-CM

## 2021-07-15 PROCEDURE — G0439 PPPS, SUBSEQ VISIT: HCPCS | Performed by: INTERNAL MEDICINE

## 2021-07-15 ASSESSMENT — MIFFLIN-ST. JEOR: SCORE: 979.37

## 2021-07-15 ASSESSMENT — ACTIVITIES OF DAILY LIVING (ADL): CURRENT_FUNCTION: NO ASSISTANCE NEEDED

## 2021-10-18 ENCOUNTER — OFFICE VISIT (OUTPATIENT)
Dept: FAMILY MEDICINE | Facility: CLINIC | Age: 81
End: 2021-10-18
Payer: MEDICARE

## 2021-10-18 ENCOUNTER — NURSE TRIAGE (OUTPATIENT)
Dept: FAMILY MEDICINE | Facility: CLINIC | Age: 81
End: 2021-10-18

## 2021-10-18 VITALS
HEART RATE: 89 BPM | BODY MASS INDEX: 24.74 KG/M2 | OXYGEN SATURATION: 97 % | DIASTOLIC BLOOD PRESSURE: 97 MMHG | WEIGHT: 126 LBS | TEMPERATURE: 98 F | RESPIRATION RATE: 16 BRPM | SYSTOLIC BLOOD PRESSURE: 147 MMHG | HEIGHT: 60 IN

## 2021-10-18 DIAGNOSIS — R03.0 ELEVATED BLOOD PRESSURE READING WITHOUT DIAGNOSIS OF HYPERTENSION: Primary | ICD-10-CM

## 2021-10-18 DIAGNOSIS — R00.0 TACHYCARDIA: ICD-10-CM

## 2021-10-18 DIAGNOSIS — I35.0 AORTIC VALVE STENOSIS, ETIOLOGY OF CARDIAC VALVE DISEASE UNSPECIFIED: ICD-10-CM

## 2021-10-18 DIAGNOSIS — F41.9 ANXIETY: ICD-10-CM

## 2021-10-18 PROCEDURE — 99214 OFFICE O/P EST MOD 30 MIN: CPT | Performed by: INTERNAL MEDICINE

## 2021-10-18 ASSESSMENT — MIFFLIN-ST. JEOR: SCORE: 968.03

## 2021-10-18 NOTE — PROGRESS NOTES
Assessment & Plan   Problem List Items Addressed This Visit     None      Visit Diagnoses     Elevated blood pressure reading without diagnosis of hypertension    -  Primary    Relevant Orders    24 Hour Blood Pressure Monitor - Adult    Aortic valve stenosis, etiology of cardiac valve disease unspecified        Anxiety        Tachycardia             Patient presenting for evaluation of fall, presents with elevated high blood pressure, she was presumed to have whitecoat syndrome per cardiology assessment.  Since she had her echocardiogram she had both a new blood pressure machine she has been persistently having high blood pressure readings.  Patient has some ongoing stressors and they are trying to sell her house, she is nervous about the finding of the echocardiogram, whole situation she is not drinking enough fluid, her heart rate has been persistently in the high 80s to low 90s; probably that is driving also her blood pressure to go up.  Advised patient to keep well-hydrated; increase fluid intake related.  Patient remains very is asymptomatic she is very physically active she runs out she walks on the treadmill 3.5 mph without any symptoms which is very reassuring her echo does show a ejection fraction of 79% suggestive of diastolic dysfunction and moderate thickening of the LV wall.  We would consider starting a low-dose of beta-blocker advised patient follow-up with cardiology  at [facility].  She will continue monitor her heart rate at home advised we can do a 24-hour blood pressure measure reading and that would be gold standard for blood pressure measurement.    Patient also had questions about dental cleaning.  We discussed about SBE prophylaxis given that she has moderate severe aortic valve stenosis she decided to delay  dental clinic for now.  She was told in the past that she does not need SBE prophylaxis but  that she has moderate severe valve lesion ; She will need SBE prophylaxis.  She does not  seem interested to pursue that.         See Patient Instructions    Return in about 1 month (around 11/18/2021), or if symptoms worsen or fail to improve, for As needed and if symptoms worsen, BP Recheck, PCP.  Total time spent was 31 minutes review of records, recommendation and exam.  Gali Jose MD  [facility] SAUNDRA Mac is a 81 year old who presents for the following health issues     HPI     129/80. 100/80,   sometimes 196 systolic,     180 SYSTOLIC RECENTLY     Had echo /100    Has white coat syndrome,     Not that high, occasionally is high, not above 150 systolic,     Years ago cardiology did not want to treat, was told she would faint if starteD on anything    No chest pain or tightness,     Had 4 stents in heart    Echo showed mod - severe Aortic S.    PHYSICALLY NO CP OR SYMPTOMS WITH EXERTION   3.5 MPH O TREADMILL, ABOUT HALF EACH DAY,       Review of Systems   Constitutional, HEENT, cardiovascular, pulmonary, gi and gu systems are negative, except as otherwise noted.      Objective    BP (!) 147/97   Pulse 89   Temp 98  F (36.7  C) (Temporal)   Resp 16   Ht 1.524 m (5')   Wt 57.2 kg (126 lb)   SpO2 97%   BMI 24.61 kg/m    Body mass index is 24.61 kg/m .  Physical Exam   GENERAL: healthy, alert and no distress  EYES: Eyes grossly normal to inspection, PERRL and conjunctivae and sclerae normal  HENT: ear canals and TM's normal, nose and mouth without ulcers or lesions  NECK: no adenopathy, no asymmetry, masses, or scars and thyroid normal to palpation  RESP: lungs clear to auscultation - no rales, rhonchi or wheezes  BREAST: normal without masses, tenderness or nipple discharge and no palpable axillary masses or adenopathy  CV: regular rate and rhythm, normal S1 S2, no S3 or S4, grade 2-3 systolic murmur right upper sternal border, no click or rub, no peripheral edema and peripheral pulses strong  ABDOMEN: soft, nontender, no hepatosplenomegaly, no  masses and bowel sounds normal  MS: no gross musculoskeletal defects noted, no edema  SKIN: no suspicious lesions or rashes  NEURO: Normal strength and tone, mentation intact and speech normal  PSYCH: mentation appears normal, affect normal/bright    Orders Only on 07/08/2021   Component Date Value Ref Range Status     Hemoglobin A1C 07/08/2021 5.5  0 - 5.6 % Final    Comment: Normal <5.7% Prediabetes 5.7-6.4%  Diabetes 6.5% or higher - adopted from ADA   consensus guidelines.       Sodium 07/08/2021 141  133 - 144 mmol/L Final     Potassium 07/08/2021 4.3  3.4 - 5.3 mmol/L Final     Chloride 07/08/2021 108  94 - 109 mmol/L Final     Carbon Dioxide 07/08/2021 27  20 - 32 mmol/L Final     Anion Gap 07/08/2021 6  3 - 14 mmol/L Final     Glucose 07/08/2021 107* 70 - 99 mg/dL Final     Urea Nitrogen 07/08/2021 20  7 - 30 mg/dL Final     Creatinine 07/08/2021 1.12* 0.52 - 1.04 mg/dL Final     GFR Estimate 07/08/2021 47* >60 mL/min/[1.73_m2] Final    Comment: Non  GFR Calc  Starting 12/18/2018, serum creatinine based estimated GFR (eGFR) will be   calculated using the Chronic Kidney Disease Epidemiology Collaboration   (CKD-EPI) equation.       GFR Estimate If Black 07/08/2021 54* >60 mL/min/[1.73_m2] Final    Comment:  GFR Calc  Starting 12/18/2018, serum creatinine based estimated GFR (eGFR) will be   calculated using the Chronic Kidney Disease Epidemiology Collaboration   (CKD-EPI) equation.       Calcium 07/08/2021 9.8  8.5 - 10.1 mg/dL Final     Bilirubin Total 07/08/2021 0.5  0.2 - 1.3 mg/dL Final     Albumin 07/08/2021 4.2  3.4 - 5.0 g/dL Final     Protein Total 07/08/2021 7.6  6.8 - 8.8 g/dL Final     Alkaline Phosphatase 07/08/2021 100  40 - 150 U/L Final     ALT 07/08/2021 28  0 - 50 U/L Final     AST 07/08/2021 22  0 - 45 U/L Final     WBC 07/08/2021 7.6  4.0 - 11.0 10e9/L Final     RBC Count 07/08/2021 4.56  3.8 - 5.2 10e12/L Final     Hemoglobin 07/08/2021 15.2  11.7 - 15.7  g/dL Final     Hematocrit 07/08/2021 45.0  35.0 - 47.0 % Final     MCV 07/08/2021 99  78 - 100 fl Final     MCH 07/08/2021 33.3* 26.5 - 33.0 pg Final     MCHC 07/08/2021 33.8  31.5 - 36.5 g/dL Final     RDW 07/08/2021 13.3  10.0 - 15.0 % Final     Platelet Count 07/08/2021 208  150 - 450 10e9/L Final

## 2021-10-18 NOTE — TELEPHONE ENCOUNTER
Ayana Fall RN Gotlieb, Paul Steven, MD;  Team Coordinators;  Kevin Triage 2 minutes ago (9:05 AM)     LJ    Patient verbalized she plans to walk into clinic with her blood pressure cuff if she does not hear from the team. Please forward this message to the appropriate team members.    Routing comment

## 2021-10-18 NOTE — TELEPHONE ENCOUNTER
"S: Elevated blood pressure readings  B: Patient had echo cardiogram last week. BP was 200/99. Patient reports having \"white coat syndrome\".   A: Taking home readings that are all elevated. Range 150's/90's. Uses an automatic, arm, blood pressure cuff. Over the weekend had a reading 188/100. Took blood pressure while on phone with this triage nurse = 171/104.  Patient reports normal blood pressure 109/60. Not on medication for hypertension. Denies: chest pain, shortness of breath, headache, blurred vision, unsteady gait, confusion, changes in speech.  R: All provider schedules are full until end of week. Attempted to schedule nurse only appointment for blood pressure check but schedule is full. Will route message to Primary Care Provider and team to huddle and develop a plan for patient. Patient verbalized she plans to walk into clinic with her blood pressure cuff if she does not hear from the team.  Protocol states patient should be seen today.    Reason for Disposition    Patient wants to be seen    Additional Information    Negative: Sounds like a life-threatening emergency to the triager    Negative: Pregnant > 20 weeks or postpartum (< 6 weeks after delivery) and new hand or face swelling    Negative: Pregnant > 20 weeks and BP > 140/90    Negative: BP Systolic BP >= 140 OR Diastolic >= 90 and postpartum (from 0 to 6 weeks after delivery)    Protocols used: HIGH BLOOD PRESSURE-A-OH      "

## 2021-10-18 NOTE — TELEPHONE ENCOUNTER
Called patient     BP was 200/99 at her echo     Still very high 180/100     Very concerned (see triage notes below)     Scheduled appt today     Next 5 appointments (look out 90 days)    Oct 18, 2021  3:00 PM  Office Visit with Gali Jose MD  [facility] ([facility] ) [address], [address]  [place] [address]  [phone]        Ayana COTTON RN

## 2022-03-24 ENCOUNTER — TRANSFERRED RECORDS (OUTPATIENT)
Dept: HEALTH INFORMATION MANAGEMENT | Facility: CLINIC | Age: 82
End: 2022-03-24
Payer: MEDICARE

## 2022-05-10 ENCOUNTER — OFFICE VISIT (OUTPATIENT)
Dept: FAMILY MEDICINE | Facility: CLINIC | Age: 82
End: 2022-05-10
Payer: MEDICARE

## 2022-05-10 VITALS
DIASTOLIC BLOOD PRESSURE: 86 MMHG | SYSTOLIC BLOOD PRESSURE: 160 MMHG | OXYGEN SATURATION: 95 % | BODY MASS INDEX: 25.72 KG/M2 | HEART RATE: 72 BPM | HEIGHT: 60 IN | RESPIRATION RATE: 16 BRPM | TEMPERATURE: 97.7 F | WEIGHT: 131 LBS

## 2022-05-10 DIAGNOSIS — R03.0 ELEVATED BP WITHOUT DIAGNOSIS OF HYPERTENSION: ICD-10-CM

## 2022-05-10 DIAGNOSIS — I35.0 SEVERE AORTIC STENOSIS: ICD-10-CM

## 2022-05-10 DIAGNOSIS — G45.9 TIA (TRANSIENT ISCHEMIC ATTACK): Primary | ICD-10-CM

## 2022-05-10 PROCEDURE — 99214 OFFICE O/P EST MOD 30 MIN: CPT | Performed by: INTERNAL MEDICINE

## 2022-05-10 RX ORDER — METOPROLOL SUCCINATE 50 MG/1
50 TABLET, EXTENDED RELEASE ORAL
COMMUNITY
Start: 2021-10-26

## 2022-05-10 RX ORDER — CLOPIDOGREL BISULFATE 75 MG/1
TABLET ORAL
COMMUNITY
Start: 2022-05-05 | End: 2022-07-28

## 2022-05-10 ASSESSMENT — PAIN SCALES - GENERAL: PAINLEVEL: NO PAIN (0)

## 2022-05-10 NOTE — PATIENT INSTRUCTIONS
Please check your blood pressure after sitting for 5 minutes, do it in the right arm.  Let me know if most of the readings are not under 140/90.    Take both the plavix and aspirin as directed.    Follow up with me in 4 weeks      Boston Majano M.D.

## 2022-05-10 NOTE — PROGRESS NOTES
This is a pleasant 81-year-old here for emergency room follow-up.  I reviewed those notes.  The patient presented to the ER on May 4 at [facility].  She was evaluated there.  I reviewed those notes.  The patient presented with lightheadedness and word finding difficulties.  She also had felt lightheaded that morning.  She had no focal weakness or confusion but some word finding issues as noted.  A stroke code was activated in the ER.  Her symptoms completely resolved in the ED and it was felt that this could have been a TIA and she was discharged with Plavix in addition to her asa.  Of note the patient flew back from [place] the day prior to her emergency room visit.  She was hypertensive in the emergency room.  Her glucose was 127 and her basic metabolic panel showed some minor abnormalities but nothing terribly significant.  CBC was done.  CT head stroke protocol showed no evidence of an acute infarct or hemorrhage.  CT angio of the head and neck was done showing no proximal large vessel occlusions or stenosis involving the major intracranial arteries.  CTA of the neck showed no flow-limiting stenosis or dissection.  EKG was normal.  She was told to follow-up with me as well as stroke neurology.  Patient is now seen in follow-up.  She has no history of stroke.    Patient has a history of aortic stenosis that is moderate to severe.  In February she was seen by cardiovascular surgery and cardiology and the recommendation is to monitor it at this point with probable need for future tavr.      The patient has cad as noted. Her blood pressure is high today but at home it has been mostly fine, although up and down.    She has chronic dizziness and was seen in [place] by neuro.    The patient feels fine now, no neuro c/o, no chest pain or shortness of breath, no syncope.    Past Medical History:   Diagnosis Date     ASCVD (arteriosclerotic cardiovascular disease) 01/01/2010    atyp cp then angio done anw and 4 stents      Elevated blood sugar      H/O colonoscopy 2005, 2013    nl; done [place] 4/19 and nl     Microhematuria 01/01/2014    Dr. Rico, cysto and renal us neg     Osteoporosis 01/01/2001    by dexa, fu done 2004 and unchanged, fu done [place]     Reflux esophagitis 01/01/2004    on ugi, then egd 2009 with hh and esophagitis; fu done 4/19 in [place]     Severe aortic stenosis     [facility]     TIA (transient ischemic attack) 05/04/2022    seen [facility], ct head, cta head and neck neg     Tremor     right hand for years     Past Surgical History:   Procedure Laterality Date     CATARACT IOL, RT/LT  2016     COSMETIC SURGERY  1994    chin     EXTERNAL EAR SURGERY      young     left arm surgery Left 2014     MASTECTOMY SIMPLE BILATERAL  1982    done due to fh, had leakage and replaced 2004     TUBAL LIGATION  1972     Social History     Socioeconomic History     Marital status:      Spouse name: Not on file     Number of children: 3     Years of education: Not on file     Highest education level: Not on file   Occupational History     Not on file   Tobacco Use     Smoking status: Never Smoker     Smokeless tobacco: Never Used   Substance and Sexual Activity     Alcohol use: No     Alcohol/week: 0.0 standard drinks     Drug use: No     Sexual activity: Yes     Partners: Male   Other Topics Concern     Not on file   Social History Narrative     Not on file     Social Determinants of Health     Financial Resource Strain: Not on file   Food Insecurity: Not on file   Transportation Needs: Not on file   Physical Activity: Not on file   Stress: Not on file   Social Connections: Not on file   Intimate Partner Violence: Not on file   Housing Stability: Not on file     Current Outpatient Medications   Medication Sig Dispense Refill     aspirin 325 MG tablet Take 162 mg by mouth daily        clopidogrel (PLAVIX) 75 MG tablet TAKE BY MOUTH 1 TABLETS ONCE DAILY       metoprolol succinate ER (TOPROL XL) 50 MG 24 hr  tablet Take 50 mg by mouth       omeprazole (PRILOSEC) 20 MG DR capsule TAKE 1 CAPSULE BY MOUTH  DAILY 90 capsule 0     rosuvastatin (CRESTOR) 5 MG tablet Take by mouth daily       Allergies   Allergen Reactions     Codeine Sulfate Nausea     Penicillin V Nausea and Vomiting     Sulfa Drugs Nausea and Vomiting     FAMILY HISTORY NOTED AND REVIEWED    REVIEW OF SYSTEMS: above    PHYSICAL EXAM    BP (!) 160/86   Pulse 72   Temp 97.7  F (36.5  C) (Temporal)   Resp 16   Ht 1.524 m (5')   Wt 59.4 kg (131 lb)   SpO2 95%   BMI 25.58 kg/m      Patient appears non toxic  Blood pressure lower left upper extrem  Mouth and eyes within normal limits  Neck within normal limits  Lungs clear  cv reglar rate and rhythm 2/6 sm, no jvp or edema  Abdomen normal active bowel sounds, soft non-tender, no mgr, no hepatosplenomegaly  Neuro: speech fluent, cn within normal limits, motor grossly within normal limits.    ASSESSMENT:  1. Probable tia, now resolved, neg eval, I doubt embolic, no signs afib, suspect small vessel dz, no signs vascular occlusion, etc  2. Severe a.s, to follow up cards, doubt related  3. Elevated blood pressure, ?real    PLAN:  Take plavix and asa for 3 weeks as rec  To emergency room if any more symptoms  Follow up cards  Monitor blood pressure, take it in right arm  Follow up with me July

## 2022-06-24 ENCOUNTER — DOCUMENTATION ONLY (OUTPATIENT)
Dept: LAB | Facility: CLINIC | Age: 82
End: 2022-06-24

## 2022-06-24 NOTE — PROGRESS NOTES
Estefania Meyer has an upcoming lab appointment:    Future Appointments   Date Time Provider Department Center   7/13/2022 11:00 AM CS LAB [facility]    7/28/2022 11:30 AM Boston Majano MD [facility]     Patient is scheduled for the following lab(s):     There is no order available. Please review and place either future orders or HMPO (Review of Health Maintenance Protocol Orders), as appropriate.    Health Maintenance Due   Topic     ANNUAL REVIEW OF HM ORDERS      HEPATITIS C SCREENING      Quynh Fernandez

## 2022-07-21 ENCOUNTER — LAB (OUTPATIENT)
Dept: LAB | Facility: CLINIC | Age: 82
End: 2022-07-21
Payer: MEDICARE

## 2022-07-21 DIAGNOSIS — Z00.00 MEDICARE ANNUAL WELLNESS VISIT, SUBSEQUENT: ICD-10-CM

## 2022-07-21 DIAGNOSIS — R73.9 ELEVATED BLOOD SUGAR: ICD-10-CM

## 2022-07-21 DIAGNOSIS — I25.10 ASCVD (ARTERIOSCLEROTIC CARDIOVASCULAR DISEASE): ICD-10-CM

## 2022-07-21 LAB
ALBUMIN SERPL-MCNC: 3.8 G/DL (ref 3.4–5)
ALP SERPL-CCNC: 78 U/L (ref 40–150)
ALT SERPL W P-5'-P-CCNC: 24 U/L (ref 0–50)
ANION GAP SERPL CALCULATED.3IONS-SCNC: 8 MMOL/L (ref 3–14)
AST SERPL W P-5'-P-CCNC: 21 U/L (ref 0–45)
BILIRUB SERPL-MCNC: 0.4 MG/DL (ref 0.2–1.3)
BUN SERPL-MCNC: 16 MG/DL (ref 7–30)
CALCIUM SERPL-MCNC: 9.5 MG/DL (ref 8.5–10.1)
CHLORIDE BLD-SCNC: 108 MMOL/L (ref 94–109)
CO2 SERPL-SCNC: 24 MMOL/L (ref 20–32)
CREAT SERPL-MCNC: 0.97 MG/DL (ref 0.52–1.04)
ERYTHROCYTE [DISTWIDTH] IN BLOOD BY AUTOMATED COUNT: 13.1 % (ref 10–15)
GFR SERPL CREATININE-BSD FRML MDRD: 59 ML/MIN/1.73M2
GLUCOSE BLD-MCNC: 109 MG/DL (ref 70–99)
HBA1C MFR BLD: 5.5 % (ref 0–5.6)
HCT VFR BLD AUTO: 43.8 % (ref 35–47)
HGB BLD-MCNC: 14.3 G/DL (ref 11.7–15.7)
MCH RBC QN AUTO: 32.4 PG (ref 26.5–33)
MCHC RBC AUTO-ENTMCNC: 32.6 G/DL (ref 31.5–36.5)
MCV RBC AUTO: 99 FL (ref 78–100)
PLATELET # BLD AUTO: 206 10E3/UL (ref 150–450)
POTASSIUM BLD-SCNC: 4.3 MMOL/L (ref 3.4–5.3)
PROT SERPL-MCNC: 7 G/DL (ref 6.8–8.8)
RBC # BLD AUTO: 4.42 10E6/UL (ref 3.8–5.2)
SODIUM SERPL-SCNC: 140 MMOL/L (ref 133–144)
WBC # BLD AUTO: 4.5 10E3/UL (ref 4–11)

## 2022-07-21 PROCEDURE — 36415 COLL VENOUS BLD VENIPUNCTURE: CPT

## 2022-07-21 PROCEDURE — 83036 HEMOGLOBIN GLYCOSYLATED A1C: CPT

## 2022-07-21 PROCEDURE — 80053 COMPREHEN METABOLIC PANEL: CPT

## 2022-07-21 PROCEDURE — 85027 COMPLETE CBC AUTOMATED: CPT

## 2022-07-21 NOTE — LETTER
July 21, 2022      Estefania Meyer  [address] [place] [address]        Dear MsJosé,    We are writing to inform you of your test results.    -Liver and gallbladder tests (ALT,AST, Alk phos,bilirubin) are normal.   -Kidney function (GFR) is low normal.   -Sodium is normal.   -Potassium is normal.   -Calcium is normal.   -Glucose is slight elevated and may be a sign of early diabetes (prediabetes). ADVISE:: eating a low carbohydrate diet, exercising, trying to lose weight (if necessary) and rechecking your glucose level in 12 months.   A1c is excellent and your blood counts are normal.       Resulted Orders   CBC with platelets   Result Value Ref Range    WBC Count 4.5 4.0 - 11.0 10e3/uL    RBC Count 4.42 3.80 - 5.20 10e6/uL    Hemoglobin 14.3 11.7 - 15.7 g/dL    Hematocrit 43.8 35.0 - 47.0 %    MCV 99 78 - 100 fL    MCH 32.4 26.5 - 33.0 pg    MCHC 32.6 31.5 - 36.5 g/dL    RDW 13.1 10.0 - 15.0 %    Platelet Count 206 150 - 450 10e3/uL   Comprehensive metabolic panel   Result Value Ref Range    Sodium 140 133 - 144 mmol/L    Potassium 4.3 3.4 - 5.3 mmol/L    Chloride 108 94 - 109 mmol/L    Carbon Dioxide (CO2) 24 20 - 32 mmol/L    Anion Gap 8 3 - 14 mmol/L    Urea Nitrogen 16 7 - 30 mg/dL    Creatinine 0.97 0.52 - 1.04 mg/dL    Calcium 9.5 8.5 - 10.1 mg/dL    Glucose 109 (H) 70 - 99 mg/dL    Alkaline Phosphatase 78 40 - 150 U/L    AST 21 0 - 45 U/L    ALT 24 0 - 50 U/L    Protein Total 7.0 6.8 - 8.8 g/dL    Albumin 3.8 3.4 - 5.0 g/dL    Bilirubin Total 0.4 0.2 - 1.3 mg/dL    GFR Estimate 59 (L) >60 mL/min/1.73m2      Comment:      Effective December 21, 2021 eGFRcr in adults is calculated using the 2021 CKD-EPI creatinine equation which includes age and gender (Mario et al., NEJM, DOI: 10.1056/GTFQzs2609676)   Hemoglobin A1c   Result Value Ref Range    Hemoglobin A1C 5.5 0.0 - 5.6 %      Comment:      Normal <5.7%   Prediabetes 5.7-6.4%    Diabetes 6.5% or higher     Note: Adopted from ADA  consensus guidelines.       If you have any questions or concerns, please call the clinic at the number listed above.       Sincerely,      Felix Jauregui MD covering Provider for Boston Majano MD

## 2022-07-28 ENCOUNTER — OFFICE VISIT (OUTPATIENT)
Dept: FAMILY MEDICINE | Facility: CLINIC | Age: 82
End: 2022-07-28
Payer: MEDICARE

## 2022-07-28 VITALS
HEIGHT: 60 IN | RESPIRATION RATE: 16 BRPM | SYSTOLIC BLOOD PRESSURE: 178 MMHG | HEART RATE: 68 BPM | DIASTOLIC BLOOD PRESSURE: 88 MMHG | OXYGEN SATURATION: 97 % | BODY MASS INDEX: 25.32 KG/M2 | WEIGHT: 129 LBS | TEMPERATURE: 97.7 F

## 2022-07-28 DIAGNOSIS — Z00.00 MEDICARE ANNUAL WELLNESS VISIT, SUBSEQUENT: Primary | ICD-10-CM

## 2022-07-28 DIAGNOSIS — I25.10 ASCVD (ARTERIOSCLEROTIC CARDIOVASCULAR DISEASE): ICD-10-CM

## 2022-07-28 DIAGNOSIS — E78.5 HYPERLIPIDEMIA LDL GOAL <100: ICD-10-CM

## 2022-07-28 DIAGNOSIS — M81.0 OSTEOPOROSIS, UNSPECIFIED OSTEOPOROSIS TYPE, UNSPECIFIED PATHOLOGICAL FRACTURE PRESENCE: ICD-10-CM

## 2022-07-28 DIAGNOSIS — I35.0 SEVERE AORTIC STENOSIS: ICD-10-CM

## 2022-07-28 DIAGNOSIS — R19.7 DIARRHEA, UNSPECIFIED TYPE: ICD-10-CM

## 2022-07-28 DIAGNOSIS — R25.1 TREMOR: ICD-10-CM

## 2022-07-28 PROCEDURE — G0439 PPPS, SUBSEQ VISIT: HCPCS | Performed by: INTERNAL MEDICINE

## 2022-07-28 ASSESSMENT — ENCOUNTER SYMPTOMS
JOINT SWELLING: 0
NERVOUS/ANXIOUS: 0
HEMATURIA: 0
PARESTHESIAS: 0
DYSURIA: 0
COUGH: 0
FREQUENCY: 0
WEAKNESS: 0
CONSTIPATION: 0
DIZZINESS: 0
SHORTNESS OF BREATH: 0
ARTHRALGIAS: 0
HEARTBURN: 0
DIARRHEA: 0
CHILLS: 0
HEMATOCHEZIA: 0
ABDOMINAL PAIN: 0
PALPITATIONS: 0
HEADACHES: 0
FEVER: 0
NAUSEA: 0
SORE THROAT: 0
EYE PAIN: 0
MYALGIAS: 0

## 2022-07-28 ASSESSMENT — PAIN SCALES - GENERAL: PAINLEVEL: NO PAIN (0)

## 2022-07-28 ASSESSMENT — ACTIVITIES OF DAILY LIVING (ADL): CURRENT_FUNCTION: NO ASSISTANCE NEEDED

## 2022-07-28 NOTE — PROGRESS NOTES
SUBJECTIVE:   Estefania Meyer is a 82 year old female who presents for Preventive Visit.    Overall she is doing very well.  She is up-to-date with gynecology.  She has had a bone density test.    She does have severe aortic stenosis.  She was seen recently by cardiology as noted and reviewed.  The plan is to do an angio and then possibly a TAVR procedure.    Her blood pressure is high today.  At home its been better and at the gynecologist she is tells me it was 120/80.  She does get very nervous coming in here.    Since May she has had 3 episodes of sudden diarrhea that are hard to control.  In between she is fine.  No abdominal pain or nausea.  No bloody or black stools.  Her colon exam was last normal in 2019.  She has been eating out more and attributes it to this    She had the episode that was presumed to be a TIA in May.  She has had none since then.               Past Medical History:      Past Medical History:   Diagnosis Date     ASCVD (arteriosclerotic cardiovascular disease) 01/01/2010    atyp cp then angio done anw and 4 stents     Elevated blood sugar      H/O colonoscopy 2005, 2013    nl; done [place] 4/19 and nl     Microhematuria 01/01/2014    Dr. Rico, cysto and renal  neg     Osteoporosis 01/01/2001    by dexa, fu done 2004 and unchanged, fu done [place]     Reflux esophagitis 01/01/2004    on ugi, then egd 2009 with hh and esophagitis; fu done 4/19 in [place]     Severe aortic stenosis     [facility] heart clinic     TIA (transient ischemic attack) 05/04/2022    [facility], ct head, cta head and neck neg     Tremor     right hand for years             Past Surgical History:      Past Surgical History:   Procedure Laterality Date     CATARACT IOL, RT/LT  2016     COSMETIC SURGERY  1994    chin     EXTERNAL EAR SURGERY      young     left arm surgery Left 2014     MASTECTOMY SIMPLE BILATERAL  1982    done due to fh, had leakage and replaced 2004     TUBAL LIGATION  1972             Social  History:     Social History     Socioeconomic History     Marital status:      Spouse name: Not on file     Number of children: 3     Years of education: Not on file     Highest education level: Not on file   Occupational History     Not on file   Tobacco Use     Smoking status: Never Smoker     Smokeless tobacco: Never Used   Substance and Sexual Activity     Alcohol use: No     Alcohol/week: 0.0 standard drinks     Drug use: No     Sexual activity: Yes     Partners: Male   Other Topics Concern     Not on file   Social History Narrative     Not on file     Social Determinants of Health     Financial Resource Strain: Not on file   Food Insecurity: Not on file   Transportation Needs: Not on file   Physical Activity: Not on file   Stress: Not on file   Social Connections: Not on file   Intimate Partner Violence: Not on file   Housing Stability: Not on file             Family History:   reviewed         Allergies:     Allergies   Allergen Reactions     Codeine Sulfate Nausea     Penicillin V Nausea and Vomiting     Sulfa Drugs Nausea and Vomiting             Medications:     Current Outpatient Medications   Medication Sig Dispense Refill     aspirin 325 MG tablet Take 162 mg by mouth daily        metoprolol succinate ER (TOPROL XL) 50 MG 24 hr tablet Take 50 mg by mouth       omeprazole (PRILOSEC) 20 MG DR capsule TAKE 1 CAPSULE BY MOUTH  DAILY 90 capsule 0     rosuvastatin (CRESTOR) 5 MG tablet Take by mouth daily                 Review of Systems:   The 10 point Review of Systems is negative other than noted in the HPI           Physical Exam:   Blood pressure (!) 178/88, pulse 68, temperature 97.7  F (36.5  C), temperature source Temporal, resp. rate 16, height 1.524 m (5'), weight 58.5 kg (129 lb), SpO2 97 %, not currently breastfeeding.    Exam:  Constitutional: healthy appearing, alert and in no distress  Heent: Normocephalic. Head without obvious masses or lesions. PERRLDC, EOMI. Mouth exam within normal  "limits: tongue, mucous membranes, posterior pharynx all normal, no lesions or abnormalities seen.  Tm's and canals within normal limits bilaterally. Neck supple, no nuchal rigidity or masses. No supraclavicular, or cervical adenopathy. Thyroid symmetric, no masses.  Cardiovascular: Regular rate and rhythm, 1/6 sm llsb, no rub or gallops.  JVP not elevated, no edema.  Carotids within normal limits bilaterally, no bruits.  Respiratory: Normal respiratory effort.  Lungs clear, normal flow, no wheezing or crackles.  Gastrointestinal: Normal active bowel sounds.   Soft, not tender, no masses, guarding or rebound.  No hepatosplenomegaly.   Musculoskeletal: extremities normal, no gross deformities noted.  Skin: no suspicious lesions or rashes   Neurologic: Mental status within normal limits.  Speech fluent.  No gross motor abnormalities and gait intact.  Psychiatric: mentation appears normal and affect normal.         Data:   Labs reviewed with patient         Assessment:   1. Normal complete physical exam  2. Severe as, follow up cards  3. Elevated blood pressure, ?real, monitor it  4. Osteopor, follow up gyn  5. Tia, no recurrence, med mgmt  6. Cad, follow up cards  7. Elevated cholesterol, on statin  8. Episodes of loose bowel movement, follow, let me know if more  9. Essential tremor  10. hcm         Plan:   Up to date immunizations  Call if more diarrhea  Follow up cards  Monitor blood pressure        Boston Majano M.D.                  Are you in the first 12 months of your Medicare coverage?  No    Healthy Habits:     In general, how would you rate your overall health?  Excellent    Frequency of exercise:  None    Do you usually eat at least 4 servings of fruit and vegetables a day, include whole grains    & fiber and avoid regularly eating high fat or \"junk\" foods?  Yes    Taking medications regularly:  Yes    Medication side effects:  None    Ability to successfully perform activities of daily living:  No " assistance needed    Home Safety:  No safety concerns identified    Hearing Impairment:  No hearing concerns    In the past 6 months, have you been bothered by leaking of urine?  No    In general, how would you rate your overall mental or emotional health?  Excellent      PHQ-2 Total Score: 0    Additional concerns today:  No    Do you feel safe in your environment? Yes    Have you ever done Advance Care Planning? (For example, a Health Directive, POLST, or a discussion with a medical provider or your loved ones about your wishes): No, advance care planning information given to patient to review.  Patient plans to discuss their wishes with loved ones or provider.         Fall risk  Fallen 2 or more times in the past year?: No  Any fall with injury in the past year?: No    Cognitive Screening   1) Repeat 3 items (Leader, Season, Table)    2) Clock draw: NORMAL  3) 3 item recall: Recalls 3 objects  Results: 3 items recalled: COGNITIVE IMPAIRMENT LESS LIKELY    Mini-CogTM Copyright NAOMI Gillis. Licensed by the author for use in [facility]; reprinted with permission (soob@[email or web address]). All rights reserved.      Do you have sleep apnea, excessive snoring or daytime drowsiness?: no    Reviewed and updated as needed this visit by clinical staff                    Reviewed and updated as needed this visit by Provider                   Social History     Tobacco Use     Smoking status: Never Smoker     Smokeless tobacco: Never Used   Substance Use Topics     Alcohol use: No     Alcohol/week: 0.0 standard drinks         Alcohol Use 7/28/2022   Prescreen: >3 drinks/day or >7 drinks/week? No   Prescreen: >3 drinks/day or >7 drinks/week? -               Current providers sharing in care for this patient include:   Patient Care Team:  Boston Majano MD as PCP - General (Internal Medicine)  Boston Majano MD as Assigned PCP    The following health maintenance items are reviewed in Epic and correct as of  today:  Health Maintenance Due   Topic Date Due     DEXA  Never done     COVID-19 Vaccine (4 - Booster for Pfizer series) 12/24/2021     MEDICARE ANNUAL WELLNESS VISIT  07/15/2022             Pertinent mammograms are reviewed under the imaging tab.    Review of Systems      OBJECTIVE:   There were no vitals taken for this visit. Estimated body mass index is 25.58 kg/m  as calculated from the following:    Height as of 5/10/22: 1.524 m (5').    Weight as of 5/10/22: 59.4 kg (131 lb).  Physical Exam          ASSESSMENT / PLAN:           COUNSELING:  Reviewed preventive health counseling, as reflected in patient instructions       Regular exercise       Healthy diet/nutrition    Estimated body mass index is 25.58 kg/m  as calculated from the following:    Height as of 5/10/22: 1.524 m (5').    Weight as of 5/10/22: 59.4 kg (131 lb).        She reports that she has never smoked. She has never used smokeless tobacco.      Appropriate preventive services were discussed with this patient, including applicable screening as appropriate for cardiovascular disease, diabetes, osteopenia/osteoporosis, and glaucoma.  As appropriate for age/gender, discussed screening for colorectal cancer, prostate cancer, breast cancer, and cervical cancer. Checklist reviewing preventive services available has been given to the patient.    Reviewed patients plan of care and provided an AVS. The Basic Care Plan (routine screening as documented in Health Maintenance) for Estefania meets the Care Plan requirement. This Care Plan has been established and reviewed with the Patient.    Counseling Resources:  ATP IV Guidelines  Pooled Cohorts Equation Calculator  Breast Cancer Risk Calculator  Breast Cancer: Medication to Reduce Risk  FRAX Risk Assessment  ICSI Preventive Guidelines  Dietary Guidelines for Americans, 2010  [employer]'s MyPlate  ASA Prophylaxis  Lung CA Screening    Boston Majano MD  [facility]    Identified Health Risks:

## 2022-08-25 NOTE — PROGRESS NOTES
Jhonathan Mac is a 82 year old, presenting for the following health issues:  No chief complaint on file.    As noted and reviewed the patient was hospitalized at [facility] for a TAVR procedure.  Prior to that she had an angiogram and a stent placed in the circumflex artery.  She is done excellent with both procedures and is on Plavix and has follow-up with cardiology in September.  She feels quite well.  She has no chest pain, shortness of breath, PND or edema.  No fevers.  No GI symptoms except for occasional bowel urgency which she also mentioned at her physical.  She is up-to-date on her colon exam and has no bloody or black stools.    Hospital Follow-up Visit:    Hospital/Nursing Home/ Rehab Facility: [facility]  Date of Admission: 8/18/22  Date of Discharge: 8/19/22  Reason(s) for Admission: aortic stenosis    Was your hospitalization related to COVID-19? No   Problems taking medications regularly:  None  Medication changes since discharge: None  Problems adhering to non-medication therapy:  None    Summary of hospitalization:  CareEverywhere information obtained and reviewed  Diagnostic Tests/Treatments reviewed.  Follow up needed: none  Other Healthcare Providers Involved in Patient s Care:         Specialist appointment - cards in September  Update since discharge: improved.         Post Medication Reconciliation Status:  meds reviewed and no changes      Plan of care communicated with patient           Past Medical History:   Diagnosis Date     ASCVD (arteriosclerotic cardiovascular disease) 01/01/2010    atyp cp then angio done [facility] and 4 stents; angio done 8/8/22 at [facility] for tavr and stent placed in circ     Elevated blood sugar      H/O colonoscopy 2005, 2013    nl; done [place] 4/19 and nl     Microhematuria 01/01/2014    Dr. Rico, cysto and renal us neg     Osteoporosis 01/01/2001    by dexa, fu done 2004 and unchanged, fu done [place]     Reflux esophagitis 01/01/2004    on ugi, then egd 2009  with hh and esophagitis; fu done 4/19 in [place]     Severe aortic stenosis     [facility] heart clinic; tavr done 8/18/22     TIA (transient ischemic attack) 05/04/2022    [facility], ct head, cta head and neck neg, word finding difficulty and dizziness     Tremor     right hand for years     Past Surgical History:   Procedure Laterality Date     CATARACT IOL, RT/LT  2016     COSMETIC SURGERY  1994    chin     EXTERNAL EAR SURGERY      young     left arm surgery Left 2014     MASTECTOMY SIMPLE BILATERAL  1982    done due to fh, had leakage and replaced 2004     TUBAL LIGATION  1972     Social History     Socioeconomic History     Marital status:      Spouse name: Not on file     Number of children: 3     Years of education: Not on file     Highest education level: Not on file   Occupational History     Not on file   Tobacco Use     Smoking status: Never Smoker     Smokeless tobacco: Never Used   Substance and Sexual Activity     Alcohol use: No     Alcohol/week: 0.0 standard drinks     Drug use: No     Sexual activity: Yes     Partners: Male   Other Topics Concern     Not on file   Social History Narrative     Not on file     Social Determinants of Health     Financial Resource Strain: Not on file   Food Insecurity: Not on file   Transportation Needs: Not on file   Physical Activity: Not on file   Stress: Not on file   Social Connections: Not on file   Intimate Partner Violence: Not on file   Housing Stability: Not on file     Current Outpatient Medications   Medication Sig Dispense Refill     clopidogrel (PLAVIX) 75 MG tablet Take 1 tablet (75 mg) by mouth daily       aspirin 325 MG tablet Take 162 mg by mouth daily        metoprolol succinate ER (TOPROL XL) 50 MG 24 hr tablet Take 50 mg by mouth       omeprazole (PRILOSEC) 20 MG DR capsule TAKE 1 CAPSULE BY MOUTH  DAILY 90 capsule 0     rosuvastatin (CRESTOR) 5 MG tablet Take by mouth daily       Allergies   Allergen Reactions     Codeine Sulfate Nausea      Penicillin V Nausea and Vomiting     Sulfa Drugs Nausea and Vomiting     FAMILY HISTORY NOTED AND REVIEWED    REVIEW OF SYSTEMS: above    PHYSICAL EXAM    /82 (BP Location: Right arm, Patient Position: Chair, Cuff Size: Adult Large)   Pulse 70   Temp 97.2  F (36.2  C) (Tympanic)   Resp 16   Ht 1.524 m (5')   Wt 57.2 kg (126 lb)   SpO2 100%   Breastfeeding No   BMI 24.61 kg/m      Patient appears non toxic  Lungs - clear, normal flow  Cardiovascular - regular rate and rhythm, no murmer, rub or gallop, no jvp or edema, carotids within normal limits, no bruits.  Abdomen - normal active bowel sounds, soft, non tender, no masses, guarding or rebound, no hepatosplenomegaly  There are hematomas that are small and ecchymosis in the groin region bilaterally.  No bruits or tenderness.    Labs noted    ASSESSMENT:  1. Severe a.s, s/p tavr and doing super, has cards follow up  2. Cad, stable, stent in place  3. Urgent bowel movement, suspect ibs, doubt tumor, colitis, meds but is on ppi  4. Prior tia, no recurrence    PLAN:  Follow up cards  Call if changes  Try citrucel daily for bowel movement, if not better consider off ppi    Boston Majano M.D.        Boston Majano M.D.

## 2022-08-26 ENCOUNTER — TRANSFERRED RECORDS (OUTPATIENT)
Dept: FAMILY MEDICINE | Facility: CLINIC | Age: 82
End: 2022-08-26

## 2022-08-26 ENCOUNTER — OFFICE VISIT (OUTPATIENT)
Dept: FAMILY MEDICINE | Facility: CLINIC | Age: 82
End: 2022-08-26
Payer: MEDICARE

## 2022-08-26 VITALS
OXYGEN SATURATION: 100 % | HEART RATE: 70 BPM | RESPIRATION RATE: 16 BRPM | SYSTOLIC BLOOD PRESSURE: 132 MMHG | TEMPERATURE: 97.2 F | WEIGHT: 126 LBS | DIASTOLIC BLOOD PRESSURE: 82 MMHG | HEIGHT: 60 IN | BODY MASS INDEX: 24.74 KG/M2

## 2022-08-26 DIAGNOSIS — I35.0 SEVERE AORTIC STENOSIS: Primary | ICD-10-CM

## 2022-08-26 DIAGNOSIS — R19.8 IRREGULAR BOWEL HABITS: ICD-10-CM

## 2022-08-26 DIAGNOSIS — G45.9 TIA (TRANSIENT ISCHEMIC ATTACK): ICD-10-CM

## 2022-08-26 DIAGNOSIS — I25.10 ASCVD (ARTERIOSCLEROTIC CARDIOVASCULAR DISEASE): ICD-10-CM

## 2022-08-26 PROCEDURE — 99214 OFFICE O/P EST MOD 30 MIN: CPT | Performed by: INTERNAL MEDICINE

## 2022-08-26 RX ORDER — CLOPIDOGREL BISULFATE 75 MG/1
75 TABLET ORAL DAILY
Start: 2022-08-26 | End: 2023-07-28

## 2022-08-26 ASSESSMENT — PAIN SCALES - GENERAL: PAINLEVEL: NO PAIN (0)

## 2022-09-19 ENCOUNTER — TRANSFERRED RECORDS (OUTPATIENT)
Dept: HEALTH INFORMATION MANAGEMENT | Facility: CLINIC | Age: 82
End: 2022-09-19

## 2022-11-21 ENCOUNTER — TRANSFERRED RECORDS (OUTPATIENT)
Dept: HEALTH INFORMATION MANAGEMENT | Facility: CLINIC | Age: 82
End: 2022-11-21

## 2023-01-16 ENCOUNTER — TRANSFERRED RECORDS (OUTPATIENT)
Dept: HEALTH INFORMATION MANAGEMENT | Facility: CLINIC | Age: 83
End: 2023-01-16

## 2023-02-09 ENCOUNTER — TRANSFERRED RECORDS (OUTPATIENT)
Dept: HEALTH INFORMATION MANAGEMENT | Facility: CLINIC | Age: 83
End: 2023-02-09

## 2023-04-20 ENCOUNTER — TRANSFERRED RECORDS (OUTPATIENT)
Dept: HEALTH INFORMATION MANAGEMENT | Facility: CLINIC | Age: 83
End: 2023-04-20
Payer: MEDICARE

## 2023-06-28 ENCOUNTER — TELEPHONE (OUTPATIENT)
Dept: FAMILY MEDICINE | Facility: CLINIC | Age: 83
End: 2023-06-28
Payer: MEDICARE

## 2023-06-28 NOTE — TELEPHONE ENCOUNTER
Reason for Call:  Appointment Request    Patient requesting this type of appt:  Preventive     Requested provider: Boston Majano    Reason patient unable to be scheduled: Not within requested timeframe    When does patient want to be seen/preferred time: 1-2 weeks    Comments: PATIENT WILL BE GONE FOR WINTER, WOULD LIKE IF APPT CAN BEFORE OCT 31, 2023    Okay to leave a detailed message?: Yes at Cell number on file:    Telephone Information:   Mobile [phone]       Call taken on 6/28/2023 at 2:50 PM by Dena Rodriguez V

## 2023-07-20 ENCOUNTER — TRANSFERRED RECORDS (OUTPATIENT)
Dept: HEALTH INFORMATION MANAGEMENT | Facility: CLINIC | Age: 83
End: 2023-07-20
Payer: MEDICARE

## 2023-07-20 ENCOUNTER — TELEPHONE (OUTPATIENT)
Dept: FAMILY MEDICINE | Facility: CLINIC | Age: 83
End: 2023-07-20
Payer: MEDICARE

## 2023-07-20 NOTE — TELEPHONE ENCOUNTER
"TO PCP  fyi    Patient calling stating, \"I seen Dr. Majano in October for a physical and then seen my ophthalmologist this morning. She feels I need to see him soon. She sent him a note this morning. I see bright figures right before I wake up.\"    Appointments in Next Year    Jul 28, 2023  7:00 AM  (Arrive by 6:45 AM)  Provider Visit with Boston Majano MD  [facility] ([facility] ) [phone]   [phone]   [phone]        Beronica Mayo RN on 7/20/2023 at 2:57 PM    "

## 2023-07-21 NOTE — TELEPHONE ENCOUNTER
Can you either check with the doctors nurse and see what they found or have them fax me his note.  I do not have it.    Thank you    Boston Majano M.D.

## 2023-07-21 NOTE — TELEPHONE ENCOUNTER
Called patient regarding PCP message below. She will contact her ophthalmologist on Monday and have her message faxed.     Beronica Mayo RN on 7/21/2023 at 3:00 PM

## 2023-07-28 ENCOUNTER — OFFICE VISIT (OUTPATIENT)
Dept: FAMILY MEDICINE | Facility: CLINIC | Age: 83
End: 2023-07-28
Payer: MEDICARE

## 2023-07-28 VITALS
HEIGHT: 60 IN | TEMPERATURE: 97.6 F | OXYGEN SATURATION: 98 % | BODY MASS INDEX: 24.85 KG/M2 | HEART RATE: 61 BPM | DIASTOLIC BLOOD PRESSURE: 78 MMHG | SYSTOLIC BLOOD PRESSURE: 180 MMHG | WEIGHT: 126.6 LBS

## 2023-07-28 DIAGNOSIS — R20.0 ARM NUMBNESS LEFT: ICD-10-CM

## 2023-07-28 DIAGNOSIS — H53.9 VISUAL DISTURBANCE: Primary | ICD-10-CM

## 2023-07-28 DIAGNOSIS — I10 ESSENTIAL HYPERTENSION: ICD-10-CM

## 2023-07-28 DIAGNOSIS — R47.01 EXPRESSIVE APHASIA: ICD-10-CM

## 2023-07-28 LAB
ALBUMIN UR-MCNC: NEGATIVE MG/DL
ANION GAP SERPL CALCULATED.3IONS-SCNC: 12 MMOL/L (ref 7–15)
APPEARANCE UR: CLEAR
BILIRUB UR QL STRIP: NEGATIVE
BUN SERPL-MCNC: 16.7 MG/DL (ref 8–23)
CALCIUM SERPL-MCNC: 9.8 MG/DL (ref 8.8–10.2)
CHLORIDE SERPL-SCNC: 107 MMOL/L (ref 98–107)
COLOR UR AUTO: YELLOW
CREAT SERPL-MCNC: 1.13 MG/DL (ref 0.51–0.95)
DEPRECATED HCO3 PLAS-SCNC: 23 MMOL/L (ref 22–29)
ERYTHROCYTE [DISTWIDTH] IN BLOOD BY AUTOMATED COUNT: 13.1 % (ref 10–15)
GFR SERPL CREATININE-BSD FRML MDRD: 49 ML/MIN/1.73M2
GLUCOSE SERPL-MCNC: 116 MG/DL (ref 70–99)
GLUCOSE UR STRIP-MCNC: NEGATIVE MG/DL
HCT VFR BLD AUTO: 43.7 % (ref 35–47)
HGB BLD-MCNC: 14.2 G/DL (ref 11.7–15.7)
HGB UR QL STRIP: NEGATIVE
KETONES UR STRIP-MCNC: NEGATIVE MG/DL
LEUKOCYTE ESTERASE UR QL STRIP: NEGATIVE
MCH RBC QN AUTO: 32 PG (ref 26.5–33)
MCHC RBC AUTO-ENTMCNC: 32.5 G/DL (ref 31.5–36.5)
MCV RBC AUTO: 98 FL (ref 78–100)
NITRATE UR QL: NEGATIVE
PH UR STRIP: 5.5 [PH] (ref 5–7)
PLATELET # BLD AUTO: 174 10E3/UL (ref 150–450)
POTASSIUM SERPL-SCNC: 4.2 MMOL/L (ref 3.4–5.3)
RBC # BLD AUTO: 4.44 10E6/UL (ref 3.8–5.2)
SODIUM SERPL-SCNC: 142 MMOL/L (ref 136–145)
SP GR UR STRIP: 1.02 (ref 1–1.03)
T4 FREE SERPL-MCNC: 1.16 NG/DL (ref 0.9–1.7)
TSH SERPL DL<=0.005 MIU/L-ACNC: 4.44 UIU/ML (ref 0.3–4.2)
UROBILINOGEN UR STRIP-ACNC: 0.2 E.U./DL
WBC # BLD AUTO: 6.1 10E3/UL (ref 4–11)

## 2023-07-28 PROCEDURE — 84439 ASSAY OF FREE THYROXINE: CPT | Performed by: INTERNAL MEDICINE

## 2023-07-28 PROCEDURE — 81003 URINALYSIS AUTO W/O SCOPE: CPT | Performed by: INTERNAL MEDICINE

## 2023-07-28 PROCEDURE — 84443 ASSAY THYROID STIM HORMONE: CPT | Performed by: INTERNAL MEDICINE

## 2023-07-28 PROCEDURE — 80048 BASIC METABOLIC PNL TOTAL CA: CPT | Performed by: INTERNAL MEDICINE

## 2023-07-28 PROCEDURE — 99214 OFFICE O/P EST MOD 30 MIN: CPT | Performed by: INTERNAL MEDICINE

## 2023-07-28 PROCEDURE — 36415 COLL VENOUS BLD VENIPUNCTURE: CPT | Performed by: INTERNAL MEDICINE

## 2023-07-28 PROCEDURE — 85027 COMPLETE CBC AUTOMATED: CPT | Performed by: INTERNAL MEDICINE

## 2023-07-28 RX ORDER — AMLODIPINE BESYLATE 5 MG/1
5 TABLET ORAL DAILY
Qty: 90 TABLET | Refills: 3 | Status: SHIPPED | OUTPATIENT
Start: 2023-07-28 | End: 2024-08-12

## 2023-07-28 ASSESSMENT — PAIN SCALES - GENERAL: PAINLEVEL: NO PAIN (0)

## 2023-07-28 NOTE — PATIENT INSTRUCTIONS
Start the new blood pressure medicine called amlodipine 5mg and take this at night, continue all your other medicines  Get the brain mri and the carotid ultrasound  See neurology  Call if worsening symptoms  See me August 3 at noon, show up 10 minutes early.        Boston Majano M.D.

## 2023-07-28 NOTE — PROGRESS NOTES
The patient presents with her  for multiple issues.    The patient notes approximately 6 episodes of visual issues over the last several months.  What she feels is that right before she wakes up there is something in front of her that she sees.  It can look like dirt but can also look like a person.  It feels like it is moving around.  She opens her eyes and then it is gone after a second or so.  She never has it during the day.  She went to see her ophthalmologist who told her to follow-up with her primary and that she did not see anything.  She also notes infrequently the left arm can be numb and the right face can be numb.  This happens infrequently with no motor symptoms.  She also finds that sometimes it is hard to get the word out.  This has been for a year.  She has no history of neurovascular problems.  She has no history of A-fib.  She is on aspirin.  She is not having headaches, fevers or unexplained weight loss.  No chest pain or shortness of breath.  She otherwise is felt well.  She does have a history of TIA.  She has had a valve replaced and has coronary artery disease as noted.  She is on no new medications.  She takes the aspirin as noted above.  She does not smoke     Past Medical History:   Diagnosis Date    ASCVD (arteriosclerotic cardiovascular disease) 01/01/2010    atyp cp then angio done [facility] and 4 stents; angio done 8/8/22 at [facility] for tavr and stent placed in circ    Elevated blood sugar     Essential hypertension 07/2023    H/O colonoscopy 2005, 2013    nl; done [place] 4/19 and nl    Microhematuria 01/01/2014    Dr. Rico, cysto and renal us neg    Osteoporosis 01/01/2001    by dexa, fu done 2004 and unchanged, fu done [place]    Reflux esophagitis 01/01/2004    on ugi, then egd 2009 with hh and esophagitis; fu done 4/19 in [place]    Severe aortic stenosis     [facility] heart clinic; tavr done 8/18/22    TIA (transient ischemic attack) 05/04/2022    seen [facility], ct head, cta head  and neck neg, word finding difficulty and dizziness    Tremor     right hand for years     Past Surgical History:   Procedure Laterality Date    CATARACT IOL, RT/LT  2016    COSMETIC SURGERY  1994    chin    EXTERNAL EAR SURGERY      young    left arm surgery Left 2014    MASTECTOMY SIMPLE BILATERAL  1982    done due to fh, had leakage and replaced 2004    TUBAL LIGATION  1972     Social History     Socioeconomic History    Marital status:      Spouse name: Not on file    Number of children: 3    Years of education: Not on file    Highest education level: Not on file   Occupational History    Not on file   Tobacco Use    Smoking status: Never    Smokeless tobacco: Never   Substance and Sexual Activity    Alcohol use: No     Alcohol/week: 0.0 standard drinks of alcohol    Drug use: No    Sexual activity: Yes     Partners: Male   Other Topics Concern    Not on file   Social History Narrative    Not on file     Social Determinants of Health     Financial Resource Strain: Not on file   Food Insecurity: Not on file   Transportation Needs: Not on file   Physical Activity: Not on file   Stress: Not on file   Social Connections: Not on file   Intimate Partner Violence: Not on file   Housing Stability: Not on file     Current Outpatient Medications   Medication Sig Dispense Refill    amLODIPine (NORVASC) 5 MG tablet Take 1 tablet (5 mg) by mouth daily 90 tablet 3    aspirin 325 MG tablet Take 162 mg by mouth daily       metoprolol succinate ER (TOPROL XL) 50 MG 24 hr tablet Take 50 mg by mouth      omeprazole (PRILOSEC) 20 MG DR capsule TAKE 1 CAPSULE BY MOUTH  DAILY 90 capsule 0    rosuvastatin (CRESTOR) 5 MG tablet Take by mouth daily       Allergies   Allergen Reactions    Codeine Sulfate Nausea    Penicillin V Nausea and Vomiting    Sulfa Antibiotics Nausea and Vomiting     FAMILY HISTORY NOTED AND REVIEWED    REVIEW OF SYSTEMS: above    PHYSICAL EXAM    BP (!) 180/78   Pulse 61   Temp 97.6  F (36.4  C)  (Tympanic)   Ht 1.524 m (5')   Wt 57.4 kg (126 lb 9.6 oz)   SpO2 98%   BMI 24.72 kg/m    Blood pressure same bilat for me  Patient appears non toxic  Lungs - clear, normal flow  Cardiovascular - regular rate and rhythm, no murmer, rub or gallop, no jvp or edema, carotids within normal limits, no bruits.  Abdomen - normal active bowel sounds, soft, non tender, no masses, guarding or rebound, no hepatosplenomegaly  Neurologic exam is unremarkable.  Cranial nerves are intact.  Motor and reflexes are intact.  Gait is intact.    Labs sent, mri and carotid us ordered    ASSESSMENT:  Neurologic episodes including intermittent vision episodes, numbness of left arm and right face, and difficulty getting words out.  It is hard to put this altogether.  It is also difficult to know 1 etiology for each of the symptoms.  I think is very unlikely to be a brain lesion or aneurysm.  I do not think this represents a neurologic event like a TIA, stroke or bleed.  I think it is unlikely that it is toxic or metabolic and I think it is very unlikely that it relates any blood pressure issues.  I do not think it is cardiovascular or A-fib.  Hypertension, suspect essential although has not had this before.  I doubt secondary cause but will check some things today.  Again I think it is unlikely that is causing the neurologic episodes but I cannot say for sure.    PLAN:  Mri brain  Carotid us  Labs  Neuro eval Aug 8  Norvasc 5mg         Boston Majano M.D.

## 2023-08-01 ENCOUNTER — NURSE TRIAGE (OUTPATIENT)
Dept: FAMILY MEDICINE | Facility: CLINIC | Age: 83
End: 2023-08-01
Payer: MEDICARE

## 2023-08-01 NOTE — TELEPHONE ENCOUNTER
Patient's daughter calling from the ER. Patient been having some symptoms congruent with a UTI. Daughter and patient told ER doc patient already had a UA. ER docs said they can't see UA. Called to get the results from UA. Nurse could not find results interpreted by you. Please interpret labs drawn 07/28/23. Daughter upset she was not able to get the results.     SHWETA Perez  [facility] Triage '

## 2023-08-01 NOTE — TELEPHONE ENCOUNTER
UA from 7/28 was normal. No evidence of infection.  However, as part of there ER evaluation, they might request she submit another urine sample given her symptoms.  Austin Aiken MD on 8/1/2023 at 2:43 PM

## 2023-08-01 NOTE — TELEPHONE ENCOUNTER
"  Nurse Triage SBAR    Is this a 2nd Level Triage? YES, LICENSED PRACTITIONER REVIEW IS REQUIRED    Situation: new onset of dizziness. Fall overnight. Denies loss of consciousness or hitting head, no open areas, bruising noted on back. Requesting provider recommendation - med change?     Background:   Pt started taking norvasc three days ago and is wondering if it is the cause of dizziness overnight last night. She was sitting on the toilet around 0400 and fell onto bathroom floor.   Pt is taking baby aspirin, bruising noted on back. Pt has continued dizziness, needing assistance to walk to prevent falls this morning. Pt triaged and instructed to go to ER/UC now. Pt's  requesting provider review/recommendation instead.  Should pt still be taking norvasc or is there is something else she should take? She has an appointment with Dr. Majano on Thurs. Pt has not had dizziness in her history. New onset of episodes where she feels like she is spinning.     Assessment: MD Assess/recommend    Protocol Recommended Disposition:   Go To ED/UCC Now (Or To Office With PCP Approval), See More Appropriate Protocol    Recommendation: answer callback from clinic     Routed to provider    Does the patient meet one of the following criteria for ADS visit consideration? 16+ years old, with an [facility] PCP     TIP  Providers, please consider if this condition is appropriate for management at one of our Acute and Diagnostic Services sites.     If patient is a good candidate, please use dotphrase <dot>triageresponse and select Refer to ADS to document.      Can we leave a detailed message on this number? YES  Phone number patient can be reached at: Home number on file [phone] (home)    Kadi Oconnor RN  [facility] Triage    1. MECHANISM: \"How did the fall happen?\"      Dizzy in bathroom in the middle night and fell off the toilet seat   2. DOMESTIC VIOLENCE AND ELDER ABUSE SCREENING: \"Did you fall because someone " "pushed you or tried to hurt you?\" If Yes, ask: \"Are you safe now?\"      No violence, Yes, pt is safe now.   3. ONSET: \"When did the fall happen?\" (e.g., minutes, hours, or days ago)      8/1/2023 0400  4. LOCATION: \"What part of the body hit the ground?\" (e.g., back, buttocks, head, hips, knees, hands, head, stomach)      Arms, back bruised   5. INJURY: \"Did you hurt (injure) yourself when you fell?\" If Yes, ask: \"What did you injure? Tell me more about this?\" (e.g., body area; type of injury; pain severity)\"      Bruising on back, fell on arms she thinks.   6. PAIN: \"Is there any pain?\" If Yes, ask: \"How bad is the pain?\" (e.g., Scale 1-10; or mild,   moderate, severe)    - NONE (0): No pain    - MILD (1-3): Doesn't interfere with normal activities     - MODERATE (4-7): Interferes with normal activities or awakens from sleep     - SEVERE (8-10): Excruciating pain, unable to do any normal activities       Denies   7. SIZE: For cuts, bruises, or swelling, ask: \"How large is it?\" (e.g., inches or centimeters)       Back is bruised   8. PREGNANCY: \"Is there any chance you are pregnant?\" \"When was your last menstrual period?\"      Denies   9. OTHER SYMPTOMS: \"Do you have any other symptoms?\" (e.g., dizziness, fever, weakness; new onset or worsening).       Denies   10. CAUSE: \"What do you think caused the fall (or falling)?\" (e.g., tripped, dizzy spell)        Dizzy spells      Reason for Disposition   New-onset or worsening dizziness and described as spinning or off balance (i.e., vertigo)   SEVERE dizziness (e.g., unable to stand, requires support to walk, feels like passing out now)    Additional Information   Negative: Major injury from dangerous force (e.g., fall > 10 feet or 3 meters)   Negative: Major bleeding (e.g., actively dripping or spurting) and can't be stopped   Negative: Shock suspected (e.g., cold/pale/clammy skin, too weak to stand)   Negative: Difficult to awaken or acting confused (e.g., disoriented, " slurred speech)   Negative: SEVERE weakness (i.e., unable to walk or barely able to walk, requires support) and new-onset or worsening   Negative: Can't stand (bear weight) or walk and new-onset after fall   Negative: Sounds like a life-threatening emergency to the triager   Negative: Fainted (passed out)   Negative: New-onset or worsening weakness of the face, arm or leg on one side of the body   Negative: SEVERE difficulty breathing (e.g., struggling for each breath, speaks in single words)   Negative: Shock suspected (e.g., cold/pale/clammy skin, too weak to stand, low BP, rapid pulse)   Negative: Difficult to awaken or acting confused (e.g., disoriented, slurred speech)   Negative: Fainted, and still feels dizzy afterwards   Negative: Overdose (accidental or intentional) of medications   Negative: New neurologic deficit that is present now: * Weakness of the face, arm, or leg on one side of the body * Numbness of the face, arm, or leg on one side of the body * Loss of speech or garbled speech   Negative: Heart beating < 50 beats per minute OR > 140 beats per minute   Negative: Sounds like a life-threatening emergency to the triager   Negative: Chest pain   Negative: Rectal bleeding, bloody stool, or tarry-black stool   Negative: Vomiting is main symptom   Negative: Diarrhea is main symptom   Negative: Headache is main symptom   Negative: Heat exhaustion suspected (i.e., dehydration from heat exposure)   Negative: Patient states that they are having an anxiety or panic attack   Negative: Dizziness from low blood sugar (i.e., < 60 mg/dl or 3.5 mmol/l)    Protocols used: Falls and Uepovgy-B-OP, Dizziness-A-OH

## 2023-08-01 NOTE — TELEPHONE ENCOUNTER
Agree with ER.  Between the visual disturbances mentioned from recent visit and these new symptoms ER is appropriate.  Austin Aiken MD on 8/1/2023 at 10:00 AM

## 2023-08-01 NOTE — TELEPHONE ENCOUNTER
Patient Contact    Attempt # 1    Was call answered?  Yes. Patient's spouse Michele answered the phone, stated that patient is resting and cannot speak over the phone. No C2C on file for patient's spouse.     Being an emergent situation, writer instructed patient's spouse to take the patient to the nearest ED for the reasons stated below by covering PCP. Patient's spouse stated that the [facility] ED [facility] and they will plan to go there.    Rosmery Pastor, RN  [facility]

## 2023-08-02 ENCOUNTER — VIRTUAL VISIT (OUTPATIENT)
Dept: FAMILY MEDICINE | Facility: CLINIC | Age: 83
End: 2023-08-02
Payer: MEDICARE

## 2023-08-02 ENCOUNTER — NURSE TRIAGE (OUTPATIENT)
Dept: NURSING | Facility: CLINIC | Age: 83
End: 2023-08-02

## 2023-08-02 DIAGNOSIS — U07.1 SARS-COV-2 POSITIVE: Primary | ICD-10-CM

## 2023-08-02 PROCEDURE — 99441 PR PHYSICIAN TELEPHONE EVALUATION 5-10 MIN: CPT | Mod: 95 | Performed by: FAMILY MEDICINE

## 2023-08-02 NOTE — PROGRESS NOTES
Estefania is a 83 year old who is being evaluated via a billable telephone visit.      What phone number would you like to be contacted at? [phone]-'s number.  How would you like to obtain your AVS? Mail a copy    Distant Location (provider location):  On-site.    Assessment & Plan     83 yr old female here for positive covid test. She tested positive today. Was seen in the ED yesterday for weakness and dizziness. She was given some IV fluids. COVID test not done in ER. She is still feeling a bit weak today. Discussed possible drug drug interactions while on Paxlovid. Recommend holding amlodipine and also rosuvastatin while on medication.     SARS-CoV-2 positive  Medication faxed, encourage increase in fluids.   - nirmatrelvir and ritonavir (PAXLOVID) 300 mg/100 mg therapy pack; Take 3 tablets by mouth 2 times daily for 5 days      FUTURE APPOINTMENTS:       - Follow-up visit in one month    Ankur Fox MD  [facility]    Subjective   Estefania is a 83 year old, presenting for the following health issues:  Covid positive (Tested positive for Covid today at home.  Patient didn't qualify for Paxlovid per triage.  Phone visit made.  Was seen in the ER yesterday at [facility] for the dizziness.  See ER note.  )        8/2/2023     2:15 PM   Additional Questions   Roomed by Madeleine Riddle CMA   Accompanied by Michele-.       HPI       COVID-19 Symptom Review  How many days ago did these symptoms start? Tested positive today for Covid today as she was still not feeling well.  Yesterday at 3:00 am is when felt dizzy.  She was seen in the ER 8-2-23 with [facility].  She was given fluids in the ER.    Are any of the following symptoms significant for you?  New or worsening difficulty breathing? No  Worsening cough? No  Fever or chills? Has had chills, no fever that they are aware of.  Headache: No  Sore throat: YES  Chest pain: No  Diarrhea: No  Body aches? She fell and has  some bruising.  She was evaluated in the ER on 8-1-23 at [facility].  Still feeling dizzy.  She needs help to get anywhere.    What treatments has patient tried? Acetaminophen   Does patient live in a nursing home, group home, or shelter? No-lives in an apartment.  Does patient have a way to get food/medications during quarantined? Yes, I have a friend or family member who can help me.      MEDICATION CHANGES:  Amlodipine 5 mg daily is a new medication.  Metoprolol was increased to 75 mg in the past two weeks.                  Review of Systems   Constitutional: Negative.    HENT:  Positive for congestion.    Respiratory:  Positive for cough.    Cardiovascular: Negative.    Gastrointestinal: Negative.    Endocrine: Negative.    Breasts:  negative.    Genitourinary: Negative.    Musculoskeletal: Negative.    Skin: Negative.    Allergic/Immunologic: Negative.    Neurological: Negative.    Hematological: Negative.  Bruises/bleeds easily: telephone.   Psychiatric/Behavioral: Negative.              Objective    Vitals - Patient Reported  Pain Score: Mild Pain (2)  Pain Loc: Other - see comment (Bruising from falling.)      Vitals:  No vitals were obtained today due to virtual visit.    Physical Exam   healthy, alert, and no distress  PSYCH: Alert and oriented times 3; coherent speech, normal   rate and volume, able to articulate logical thoughts, able   to abstract reason, no tangential thoughts, no hallucinations   or delusions  Her affect is normal  RESP: No cough, no audible wheezing, able to talk in full sentences  Remainder of exam unable to be completed due to telephone visits            Phone call duration: 7 minutes

## 2023-08-02 NOTE — TELEPHONE ENCOUNTER
Estefania gave consent to speak with Michele spouse.  Estefania came back from a wedding on Sunday and patient fell on Sunday night.  Estefania went to ER and MRI done and treatment with meclizine.  Today Estefania is still feeling dizzy.  Today Estefania was tested for covid home test and is positive.  8/2/2023.    Symptoms are sore throat and body aches and fatigue.  Patient is requesting paxlovid.  Symptoms started on Monday 8/1/2023  Pharmacy of choice is [facility] in [employer] and telephone is [phone]. [address] in [place].       COVID Positive/Requesting COVID treatment    Patient is positive for COVID and requesting treatment options.    Date of positive COVID test (PCR or at home)? Home 8/2/2023  Current COVID symptoms: fatigue, muscle or body aches, and sore throat  Date COVID symptoms began: 8/1/2023    Message should be routed to clinic RN pool. Best phone number to use for call back: [phone].       Reason for Disposition   [1] HIGH RISK for severe COVID complications (e.g., weak immune system, age > 64 years, obesity with BMI of 30 or higher, pregnant, chronic lung disease or other chronic medical condition) AND [2] COVID symptoms (e.g., cough, fever)  (Exceptions: Already seen by PCP and no new or worsening symptoms.)    Additional Information   Negative: SEVERE difficulty breathing (e.g., struggling for each breath, speaks in single words)   Negative: Difficult to awaken or acting confused (e.g., disoriented, slurred speech)   Negative: Bluish (or gray) lips or face now   Negative: Shock suspected (e.g., cold/pale/clammy skin, too weak to stand, low BP, rapid pulse)   Negative: Sounds like a life-threatening emergency to the triager   Negative: SEVERE or constant chest pain or pressure  (Exception: Mild central chest pain, present only when coughing.)   Negative: MODERATE difficulty breathing (e.g., speaks in phrases, SOB even at rest, pulse 100-120)   Negative: Headache and stiff neck (can't touch  chin to chest)   Negative: Oxygen level (e.g., pulse oximetry) 90 percent or lower   Negative: Chest pain or pressure  (Exception: MILD central chest pain, present only when coughing)   Negative: Patient sounds very sick or weak to the triager   Negative: MILD difficulty breathing (e.g., minimal/no SOB at rest, SOB with walking, pulse <100)   Negative: Fever > 103 F (39.4 C)   Negative: [1] Fever > 101 F (38.3 C) AND [2] over 60 years of age   Negative: [1] Fever > 100.0 F (37.8 C) AND [2] bedridden (e.g., nursing home patient, CVA, chronic illness, recovering from surgery)    Protocols used: Coronavirus (COVID-19) Diagnosed or [id number]

## 2023-08-02 NOTE — TELEPHONE ENCOUNTER
Patient daughter reports pt has a positive COVID-19 test today. She has future appt with PCP that needs to be cancelled. Daughter is requesting advice on COVID-19 tx. She was seen at ER due to dizziness yesterday. Pt was not tested for COVID-19 at ER..Daughter states she will hang up since patients  is talking to another nurse now. Routing message to Hub RNs for follow up. Writer was unable to triage further. Future OV with PCP was cancelled.

## 2023-08-02 NOTE — TELEPHONE ENCOUNTER
Nurse called patient regarding covid diagnosis and possible treatment. Nurse spoke to patient's , informing him that the patient does not qualify for the paxlovid protocol due to her current medications (amlodipine). Nurse transferred patient's  to scheduling to get a virtual appointment with a provider.     Juan KINGN  [facility]

## 2023-08-03 ENCOUNTER — TELEPHONE (OUTPATIENT)
Dept: FAMILY MEDICINE | Facility: CLINIC | Age: 83
End: 2023-08-03

## 2023-08-03 ASSESSMENT — ENCOUNTER SYMPTOMS
PSYCHIATRIC NEGATIVE: 1
CARDIOVASCULAR NEGATIVE: 1
COUGH: 1
GASTROINTESTINAL NEGATIVE: 1
NEUROLOGICAL NEGATIVE: 1
ALLERGIC/IMMUNOLOGIC NEGATIVE: 1
MUSCULOSKELETAL NEGATIVE: 1
CONSTITUTIONAL NEGATIVE: 1
ENDOCRINE NEGATIVE: 1
HEMATOLOGIC/LYMPHATIC NEGATIVE: 1

## 2023-08-03 NOTE — TELEPHONE ENCOUNTER
Patient is not 100% but is feeling a little better. Patient stopped taking all her medications. She head that some meds interfere with Covid and thus has stopped all her medications. Should she start taking them all again?    Has not been taking the Norvasc.

## 2023-08-03 NOTE — TELEPHONE ENCOUNTER
Please call the patient and let her know that her labs look fine.  I also understand she has COVID.  Please make sure that she is feeling better and let me know if there is anything I need to do to help.    I started her on Norvasc when she was here.  She was supposed to see me today but obviously was sick so could not.  Please find out how the meds working, if she is having any side effects and if she has been able to check her blood pressure.    Thank you very much    Boston Majano M.D.

## 2023-08-04 NOTE — TELEPHONE ENCOUNTER
Patient Contact    Attempt # 1    Was call answered? No.    Left message for patient to call triage back.    Beronica Holly RN

## 2023-08-04 NOTE — TELEPHONE ENCOUNTER
Pt informed of providers message.  She wants to schedule future office visit with PCP. Appt was scheduled.     Pt asked if she can take ASA and Omeprazole while on Paxlovid. Triage advised she can continue taking both medications-omeprazole and ASA.     No further action is needed unless other recombinations from provider.

## 2023-08-04 NOTE — TELEPHONE ENCOUNTER
She should stay off her Crestor while on the treatment and for 3 days after.  She can take the amlodipine but I would do a half a pill a day while on treatment and for 3 days after.  The metoprolol is perfectly fine to take.    Thank you    Boston Majano M.D.

## 2023-08-09 ENCOUNTER — TELEPHONE (OUTPATIENT)
Dept: FAMILY MEDICINE | Facility: CLINIC | Age: 83
End: 2023-08-09
Payer: MEDICARE

## 2023-08-09 DIAGNOSIS — R47.01 EXPRESSIVE APHASIA: ICD-10-CM

## 2023-08-09 DIAGNOSIS — G45.9 TIA (TRANSIENT ISCHEMIC ATTACK): Primary | ICD-10-CM

## 2023-08-09 DIAGNOSIS — R20.0 ARM NUMBNESS LEFT: ICD-10-CM

## 2023-08-09 DIAGNOSIS — H53.9 VISUAL DISTURBANCE: ICD-10-CM

## 2023-08-09 NOTE — TELEPHONE ENCOUNTER
Abran is not taking new patient's, rec first available either at his clinic [facility] or [facility].    If symptoms changing let me know please    Boston Majano M.D.

## 2023-08-09 NOTE — TELEPHONE ENCOUNTER
Not sure which neuro group is [facility], but the referral should work either way.  He is retiring so not taking new patient's.    Boston Majano M.D.

## 2023-08-09 NOTE — TELEPHONE ENCOUNTER
Patients  was called with providers message. He asked if PCP can talk to doctor Abran so he can accept her as a pt. States PCP had recommended him before. Triage gave  [facility] scheduling phone number to schedule appt with next available neurologist. Also advised him to check with [facility] and [facility] of Neurology. He verbalized agreement with plan.     Routing message to provider with husbands request.

## 2023-08-09 NOTE — TELEPHONE ENCOUNTER
Patients  Michele called requesting urgent neurology referral. Pt has been having vision and speech problems. She was seen at ER and had a neck MRI. Pt was advised to follow up with neurology after ER discharge, but pt had to cancel appt with neurologist since she had COVID-19. Pt wants to see doctor Abran. Triage advice if worsening or new symptoms pt needs to return to ER.     Ok to leave a detailed voice message with providers advice. Pt gave triage verbal approval to talk to .

## 2023-08-09 NOTE — TELEPHONE ENCOUNTER
Notified  of below.  Esperanza Philip, RN  [facility] RN Triage Team

## 2023-08-29 ENCOUNTER — OFFICE VISIT (OUTPATIENT)
Dept: FAMILY MEDICINE | Facility: CLINIC | Age: 83
End: 2023-08-29
Payer: MEDICARE

## 2023-08-29 VITALS
OXYGEN SATURATION: 96 % | SYSTOLIC BLOOD PRESSURE: 138 MMHG | HEART RATE: 77 BPM | DIASTOLIC BLOOD PRESSURE: 84 MMHG | WEIGHT: 125 LBS | RESPIRATION RATE: 16 BRPM | HEIGHT: 60 IN | TEMPERATURE: 97.7 F | BODY MASS INDEX: 24.54 KG/M2

## 2023-08-29 DIAGNOSIS — R79.89 ELEVATED TSH: Primary | ICD-10-CM

## 2023-08-29 DIAGNOSIS — R20.0 ARM NUMBNESS LEFT: ICD-10-CM

## 2023-08-29 DIAGNOSIS — E78.5 HYPERLIPIDEMIA LDL GOAL <100: ICD-10-CM

## 2023-08-29 DIAGNOSIS — H53.9 VISUAL DISTURBANCE: ICD-10-CM

## 2023-08-29 DIAGNOSIS — R73.9 ELEVATED BLOOD SUGAR: ICD-10-CM

## 2023-08-29 DIAGNOSIS — I10 ESSENTIAL HYPERTENSION: ICD-10-CM

## 2023-08-29 PROCEDURE — 99213 OFFICE O/P EST LOW 20 MIN: CPT | Performed by: INTERNAL MEDICINE

## 2023-08-29 ASSESSMENT — PAIN SCALES - GENERAL: PAINLEVEL: NO PAIN (0)

## 2023-08-29 NOTE — PROGRESS NOTES
The patient presents with her  in follow-up to her recent visit on July28th.  At that time as noted she describes 6 episode of visual issues for several months whereby before she woke up something would be in front of her that look like dirt but also could look like a person moving around.  When she opened her eyes it would be gone after a second or so.  She never had it during the day.  Her ophthalmologist told her to follow-up with me.  She also at that time noted that infrequently the left arm can be numb and the right face can be numb with no motor symptoms.  She also notes for a year its been hard to get the words out at times.  She is on aspirin.  She was not having headaches or fevers.  Evaluation at that time included labs which were really unremarkable.  I also ordered an MRI and carotid ultrasound, MRI of her brain, which she ultimately had done during an emergency room visit for dizziness.  I reviewed that as well.  This did not show any occlusions, hemorrhage or infarcts.  She was supposed to see neurology earlier in August but had COVID so this was rescheduled to September 11.    Since I have seen her last she has had 1 episode of the vision issues but no other changes or new symptoms and has not had the tingling or numbness.    I also added amlodipine for her blood pressure and she has been on this and tolerating it well.  She otherwise feels fine.    Past Medical History:   Diagnosis Date    ASCVD (arteriosclerotic cardiovascular disease) 01/01/2010    atyp cp then angio done [facility] and 4 stents; angio done 8/8/22 at [facility] for tavr and stent placed in circ    Elevated blood sugar     Essential hypertension 07/2023    H/O colonoscopy 2005, 2013    nl; done [place] 4/19 and nl    Microhematuria 01/01/2014    Dr. Rico, cysto and renal us neg    Osteoporosis 01/01/2001    by dexa, fu done 2004 and unchanged, fu done [place]    Reflux esophagitis 01/01/2004    on ugi, then egd 2009 with hh and  esophagitis; fu done 4/19 in [place]    Severe aortic stenosis     [facility]; tavr done 8/18/22    TIA (transient ischemic attack) 05/04/2022    [facility], ct head, cta head and neck neg, word finding difficulty and dizziness    Tremor     right hand for years     Past Surgical History:   Procedure Laterality Date    CATARACT IOL, RT/LT  2016    COSMETIC SURGERY  1994    chin    EXTERNAL EAR SURGERY      young    left arm surgery Left 2014    MASTECTOMY SIMPLE BILATERAL  1982    done due to fh, had leakage and replaced 2004    TUBAL LIGATION  1972     Social History     Socioeconomic History    Marital status:      Spouse name: Not on file    Number of children: 3    Years of education: Not on file    Highest education level: Not on file   Occupational History    Not on file   Tobacco Use    Smoking status: Never    Smokeless tobacco: Never   Vaping Use    Vaping Use: Never used   Substance and Sexual Activity    Alcohol use: No     Alcohol/week: 0.0 standard drinks of alcohol    Drug use: No    Sexual activity: Yes     Partners: Male   Other Topics Concern    Not on file   Social History Narrative    Not on file     Social Determinants of Health     Financial Resource Strain: Not on file   Food Insecurity: Not on file   Transportation Needs: Not on file   Physical Activity: Not on file   Stress: Not on file   Social Connections: Not on file   Intimate Partner Violence: Not on file   Housing Stability: Not on file     Current Outpatient Medications   Medication Sig Dispense Refill    amLODIPine (NORVASC) 5 MG tablet Take 1 tablet (5 mg) by mouth daily 90 tablet 3    aspirin 325 MG tablet Take 162 mg by mouth daily       metoprolol succinate ER (TOPROL XL) 50 MG 24 hr tablet Take 50 mg by mouth      omeprazole (PRILOSEC) 20 MG DR capsule TAKE 1 CAPSULE BY MOUTH  DAILY 90 capsule 0    rosuvastatin (CRESTOR) 5 MG tablet Take by mouth daily       Allergies   Allergen Reactions    Codeine Sulfate  Nausea    Penicillin V Nausea and Vomiting    Sulfa Antibiotics Nausea and Vomiting     FAMILY HISTORY NOTED AND REVIEWED    REVIEW OF SYSTEMS: above    PHYSICAL EXAM    /84   Pulse 77   Temp 97.7  F (36.5  C) (Temporal)   Resp 16   Ht 1.524 m (5')   Wt 56.7 kg (125 lb)   SpO2 96%   BMI 24.41 kg/m      Patient appears non toxic  Lungs clear  Cv reglar rate and rhythm, no edema    ASSESSMENT:  Hypertension, controlled  Visual episodes, arm and face numbness, etiology not clear  Elevated tsh, follow    PLAN:  See neuro  Call if changes  Follow up for complete physical exam in Oct.      Boston Majano M.D.

## 2023-09-30 ENCOUNTER — HEALTH MAINTENANCE LETTER (OUTPATIENT)
Age: 83
End: 2023-09-30

## 2023-10-18 ENCOUNTER — LAB (OUTPATIENT)
Dept: LAB | Facility: CLINIC | Age: 83
End: 2023-10-18
Payer: MEDICARE

## 2023-10-18 DIAGNOSIS — R73.9 ELEVATED BLOOD SUGAR: ICD-10-CM

## 2023-10-18 DIAGNOSIS — E78.5 HYPERLIPIDEMIA LDL GOAL <100: ICD-10-CM

## 2023-10-18 LAB
CHOLEST SERPL-MCNC: 135 MG/DL
HBA1C MFR BLD: 5.6 % (ref 0–5.6)
HDLC SERPL-MCNC: 60 MG/DL
LDLC SERPL CALC-MCNC: 50 MG/DL
NONHDLC SERPL-MCNC: 75 MG/DL
TRIGL SERPL-MCNC: 126 MG/DL

## 2023-10-18 PROCEDURE — 80061 LIPID PANEL: CPT

## 2023-10-18 PROCEDURE — 83036 HEMOGLOBIN GLYCOSYLATED A1C: CPT

## 2023-10-18 PROCEDURE — 36415 COLL VENOUS BLD VENIPUNCTURE: CPT

## 2023-10-30 ENCOUNTER — OFFICE VISIT (OUTPATIENT)
Dept: FAMILY MEDICINE | Facility: CLINIC | Age: 83
End: 2023-10-30
Payer: MEDICARE

## 2023-10-30 VITALS
HEIGHT: 60 IN | HEART RATE: 64 BPM | BODY MASS INDEX: 25.21 KG/M2 | RESPIRATION RATE: 18 BRPM | TEMPERATURE: 97.7 F | DIASTOLIC BLOOD PRESSURE: 76 MMHG | WEIGHT: 128.4 LBS | OXYGEN SATURATION: 99 % | SYSTOLIC BLOOD PRESSURE: 134 MMHG

## 2023-10-30 DIAGNOSIS — E78.5 HYPERLIPIDEMIA LDL GOAL <100: ICD-10-CM

## 2023-10-30 DIAGNOSIS — I10 ESSENTIAL HYPERTENSION: ICD-10-CM

## 2023-10-30 DIAGNOSIS — Z00.00 MEDICARE ANNUAL WELLNESS VISIT, SUBSEQUENT: Primary | ICD-10-CM

## 2023-10-30 DIAGNOSIS — K21.00 GASTROESOPHAGEAL REFLUX DISEASE WITH ESOPHAGITIS, UNSPECIFIED WHETHER HEMORRHAGE: ICD-10-CM

## 2023-10-30 DIAGNOSIS — I35.0 SEVERE AORTIC STENOSIS: ICD-10-CM

## 2023-10-30 DIAGNOSIS — R79.89 ELEVATED TSH: ICD-10-CM

## 2023-10-30 DIAGNOSIS — G45.9 TIA (TRANSIENT ISCHEMIC ATTACK): ICD-10-CM

## 2023-10-30 DIAGNOSIS — I25.10 ASCVD (ARTERIOSCLEROTIC CARDIOVASCULAR DISEASE): ICD-10-CM

## 2023-10-30 DIAGNOSIS — M81.0 OSTEOPOROSIS, UNSPECIFIED OSTEOPOROSIS TYPE, UNSPECIFIED PATHOLOGICAL FRACTURE PRESENCE: ICD-10-CM

## 2023-10-30 DIAGNOSIS — R73.9 ELEVATED BLOOD SUGAR: ICD-10-CM

## 2023-10-30 PROCEDURE — G0439 PPPS, SUBSEQ VISIT: HCPCS | Performed by: INTERNAL MEDICINE

## 2023-10-30 RX ORDER — AZITHROMYCIN 500 MG/1
TABLET, FILM COATED ORAL
Qty: 1 TABLET | Refills: 4 | Status: SHIPPED | OUTPATIENT
Start: 2023-10-30

## 2023-10-30 ASSESSMENT — ENCOUNTER SYMPTOMS
CONSTIPATION: 0
FREQUENCY: 0
DYSURIA: 0
WEAKNESS: 0
SORE THROAT: 0
ARTHRALGIAS: 0
NAUSEA: 0
NERVOUS/ANXIOUS: 0
BREAST MASS: 0
HEADACHES: 0
FEVER: 0
HEARTBURN: 0
JOINT SWELLING: 0
HEMATURIA: 0
PARESTHESIAS: 0
EYE PAIN: 0
MYALGIAS: 0
PALPITATIONS: 0
SHORTNESS OF BREATH: 0
DIARRHEA: 0
HEMATOCHEZIA: 0
ABDOMINAL PAIN: 0
CHILLS: 0
COUGH: 0
DIZZINESS: 1

## 2023-10-30 ASSESSMENT — PAIN SCALES - GENERAL: PAINLEVEL: NO PAIN (0)

## 2023-10-30 ASSESSMENT — ACTIVITIES OF DAILY LIVING (ADL): CURRENT_FUNCTION: NO ASSISTANCE NEEDED

## 2023-10-30 NOTE — PROGRESS NOTES
SUBJECTIVE:   Estefania is a 83 year old who presents for Preventive Visit.    Since I last saw her she has had no further visual hallucinations and was seen by neurology as noted.  An EEG was negative and prior imaging has been negative.    She does note for several years lightheadedness that can be movement related.  The MRI did not reveal anything.  She notes it more when she goes from lying down to sitting up.    She is not exercising regularly.  She is up-to-date with cardiology and will be seeing gynecology and gets her bone density there.  She otherwise has no complaints               Past Medical History:      Past Medical History:   Diagnosis Date    ASCVD (arteriosclerotic cardiovascular disease) 01/01/2010    atyp cp then angio done [facility] and 4 stents; angio done 8/8/22 at [facility] for tavr and stent placed in circ    Elevated blood sugar     Elevated TSH 07/2023    Essential hypertension 07/2023    H/O colonoscopy 2005, 2013    nl; done [place] 4/19 and nl    Microhematuria 01/01/2014    Dr. Rico, cysto and renal us neg    Osteoporosis 01/01/2001    by dexa, fu done 2004 and unchanged, fu done [place]    Reflux esophagitis 01/01/2004    on ugi, then egd 2009 with hh and esophagitis; fu done 4/19 in [place]    Severe aortic stenosis     [facility] heart clinic; tavr done 8/18/22    TIA (transient ischemic attack) 05/04/2022    [facility], ct head, cta head and neck neg, word finding difficulty and dizziness    Tremor     right hand for years    Visual hallucinations 2023    imaging neg, seen by neuro and no cause found             Past Surgical History:      Past Surgical History:   Procedure Laterality Date    CATARACT IOL, RT/LT  2016    COSMETIC SURGERY  1994    chin    EXTERNAL EAR SURGERY      young    left arm surgery Left 2014    MASTECTOMY SIMPLE BILATERAL  1982    done due to fh, had leakage and replaced 2004    TUBAL LIGATION  1972             Social History:     Social History     Socioeconomic  History    Marital status:      Spouse name: Not on file    Number of children: 3    Years of education: Not on file    Highest education level: Not on file   Occupational History    Not on file   Tobacco Use    Smoking status: Never    Smokeless tobacco: Never   Vaping Use    Vaping Use: Never used   Substance and Sexual Activity    Alcohol use: No     Alcohol/week: 0.0 standard drinks of alcohol    Drug use: No    Sexual activity: Yes     Partners: Male   Other Topics Concern    Not on file   Social History Narrative    Not on file     Social Determinants of Health     Financial Resource Strain: Low Risk  (10/30/2023)    Financial Resource Strain     Within the past 12 months, have you or your family members you live with been unable to get utilities (heat, electricity) when it was really needed?: No   Food Insecurity: Low Risk  (10/30/2023)    Food Insecurity     Within the past 12 months, did you worry that your food would run out before you got money to buy more?: No     Within the past 12 months, did the food you bought just not last and you didn t have money to get more?: No   Transportation Needs: Low Risk  (10/30/2023)    Transportation Needs     Within the past 12 months, has lack of transportation kept you from medical appointments, getting your medicines, non-medical meetings or appointments, work, or from getting things that you need?: No   Physical Activity: Not on file   Stress: Not on file   Social Connections: Not on file   Interpersonal Safety: Not on file   Housing Stability: Low Risk  (10/30/2023)    Housing Stability     Do you have housing? : Yes     Are you worried about losing your housing?: No             Family History:   reviewed         Allergies:     Allergies   Allergen Reactions    Codeine Sulfate Nausea    Penicillin V Nausea and Vomiting    Sulfa Antibiotics Nausea and Vomiting             Medications:     Current Outpatient Medications   Medication Sig Dispense Refill     amLODIPine (NORVASC) 5 MG tablet Take 1 tablet (5 mg) by mouth daily 90 tablet 3    aspirin 325 MG tablet Take 162 mg by mouth daily       azithromycin (ZITHROMAX) 500 MG tablet Take one pill one hour prior to dental work 1 tablet 4    metoprolol succinate ER (TOPROL XL) 50 MG 24 hr tablet Take 50 mg by mouth      omeprazole (PRILOSEC) 20 MG DR capsule TAKE 1 CAPSULE BY MOUTH  DAILY 90 capsule 0    rosuvastatin (CRESTOR) 5 MG tablet Take by mouth daily                 Review of Systems:     The 10 point Review of Systems is negative other than noted in the HPI           Physical Exam:   Blood pressure 134/76, pulse 64, temperature 97.7  F (36.5  C), temperature source Oral, resp. rate 18, height 1.524 m (5'), weight 58.2 kg (128 lb 6.4 oz), SpO2 99%, not currently breastfeeding.    Exam:  Constitutional: healthy appearing, alert and in no distress  Heent: Normocephalic. Head without obvious masses or lesions. PERRLDC, EOMI. Mouth exam within normal limits: tongue, mucous membranes, posterior pharynx all normal, no lesions or abnormalities seen.  Tm's and canals within normal limits bilaterally. Neck supple, no nuchal rigidity or masses. No supraclavicular, or cervical adenopathy. Thyroid symmetric, no masses.  Cardiovascular: Regular rate and rhythm, no murmer, rub or gallops.  JVP not elevated, no edema.  Carotids within normal limits bilaterally, no bruits.  Respiratory: Normal respiratory effort.  Lungs clear, normal flow, no wheezing or crackles.  Gastrointestinal: Normal active bowel sounds.   Soft, not tender, no masses, guarding or rebound.  No hepatosplenomegaly.   Musculoskeletal: extremities normal, no gross deformities noted.  Skin: no suspicious lesions or rashes   Neurologic: Mental status within normal limits.  Speech fluent.  No gross motor abnormalities and gait intact.  Psychiatric: mentation appears normal and affect normal.         Data:   Labs reviewed with patient         Assessment:   Normal  "complete physical exam  Cad, med mgmt  Aortic stenosis, tavr done 2022, follow up cards  Osteopor, follow up gyn  Gerd, no issues  Hypertension, controlled  Tia, no issues  Visual hallucinations, resolved, follow up neuro  Tia, no recurrence  Elevated sugar, exercise, diet  hcm         Plan:   Immunizations in [place]  Vestibular rehab in [place] as leaving in 2 days  Exercise  Follow up with specialists  Call if changes      Boston Majano M.D.                10/30/2023    10:36 AM   Additional Questions   Roomed by nata   Accompanied by spouse       Are you in the first 12 months of your Medicare coverage?  No    Healthy Habits:     In general, how would you rate your overall health?  Fair    Frequency of exercise:  None    Do you usually eat at least 4 servings of fruit and vegetables a day, include whole grains    & fiber and avoid regularly eating high fat or \"junk\" foods?  No    Taking medications regularly:  No    Barriers to taking medications:  None    Ability to successfully perform activities of daily living:  No assistance needed    Home Safety:  No safety concerns identified    Hearing Impairment:  No hearing concerns    In the past 6 months, have you been bothered by leaking of urine?  No    In general, how would you rate your overall mental or emotional health?  Fair    Additional concerns today:  Yes          Have you ever done Advance Care Planning? (For example, a Health Directive, POLST, or a discussion with a medical provider or your loved ones about your wishes): No, advance care planning information given to patient to review.  Patient plans to discuss their wishes with loved ones or provider.         Fall risk  Fallen 2 or more times in the past year?: No  Any fall with injury in the past year?: No    Cognitive Screening   1) Repeat 3 items (Leader, Season, Table)     2) Clock draw: NORMAL  3) 3 item recall: Recalls 3 objects  Results: 3 items recalled: COGNITIVE IMPAIRMENT LESS " LIKELY    Mini-CogTM Copyright NAOMI Gillis. Licensed by the author for use in [facility]; reprinted with permission (binu@.[facility]). All rights reserved.      Do you have sleep apnea, excessive snoring or daytime drowsiness? : no    Reviewed and updated as needed this visit by clinical staff   Tobacco  Allergies  Meds   Med Hx            Reviewed and updated as needed this visit by Provider       Med Hx           Social History     Tobacco Use    Smoking status: Never    Smokeless tobacco: Never   Substance Use Topics    Alcohol use: No     Alcohol/week: 0.0 standard drinks of alcohol             10/30/2023    10:40 AM   Alcohol Use   Prescreen: >3 drinks/day or >7 drinks/week? Not Applicable          No data to display              Do you have a current opioid prescription? No  Do you use any other controlled substances or medications that are not prescribed by a provider? None              Current providers sharing in care for this patient include:   Patient Care Team:  Boston Majano MD as PCP - General (Internal Medicine)  Boston Majano MD as Assigned PCP    The following health maintenance items are reviewed in Epic and correct as of today:  Health Maintenance   Topic Date Due    DEXA  Never done    ANNUAL REVIEW OF HM ORDERS  06/24/2023    INFLUENZA VACCINE (1) 09/01/2023    COVID-19 Vaccine (7 - 2023-24 season) 09/01/2023    DTAP/TDAP/TD IMMUNIZATION (2 - Td or Tdap) 11/12/2023    MEDICARE ANNUAL WELLNESS VISIT  10/30/2024    FALL RISK ASSESSMENT  10/30/2024    ADVANCE CARE PLANNING  10/30/2028    PHQ-2 (once per calendar year)  Completed    Pneumococcal Vaccine: 65+ Years  Completed    ZOSTER IMMUNIZATION  Completed    RSV VACCINE 60+  Completed    IPV IMMUNIZATION  Aged Out    HPV IMMUNIZATION  Aged Out    MENINGITIS IMMUNIZATION  Aged Out             Pertinent mammograms are reviewed under the imaging tab.    Review of Systems   Constitutional:  Negative for chills and fever.    HENT:  Negative for congestion, ear pain, hearing loss and sore throat.    Eyes:  Negative for pain and visual disturbance.   Respiratory:  Negative for cough and shortness of breath.    Cardiovascular:  Negative for chest pain, palpitations and peripheral edema.   Gastrointestinal:  Negative for abdominal pain, constipation, diarrhea, heartburn, hematochezia and nausea.   Breasts:  Negative for tenderness, breast mass and discharge.   Genitourinary:  Negative for dysuria, frequency, genital sores, hematuria, pelvic pain, urgency, vaginal bleeding and vaginal discharge.   Musculoskeletal:  Negative for arthralgias, joint swelling and myalgias.   Skin:  Negative for rash.   Neurological:  Positive for dizziness. Negative for weakness, headaches and paresthesias.   Psychiatric/Behavioral:  Negative for mood changes. The patient is not nervous/anxious.          OBJECTIVE:   /76   Pulse 64   Temp 97.7  F (36.5  C) (Oral)   Resp 18   Ht 1.524 m (5')   Wt 58.2 kg (128 lb 6.4 oz)   SpO2 99%   BMI 25.08 kg/m   Estimated body mass index is 25.08 kg/m  as calculated from the following:    Height as of this encounter: 1.524 m (5').    Weight as of this encounter: 58.2 kg (128 lb 6.4 oz).  Physical Exam          ASSESSMENT / PLAN:             COUNSELING:  Reviewed preventive health counseling, as reflected in patient instructions       Regular exercise        She reports that she has never smoked. She has never used smokeless tobacco.      Appropriate preventive services were discussed with this patient, including applicable screening as appropriate for fall prevention, nutrition, physical activity, Tobacco-use cessation, weight loss and cognition.  Checklist reviewing preventive services available has been given to the patient.    Reviewed patients plan of care and provided an AVS. The Basic Care Plan (routine screening as documented in Health Maintenance) for Estefania meets the Care Plan requirement. This Care  Plan has been established and reviewed with the Patient and spouse.          Boston Majano MD  [facility]    Identified Health Risks:  I have reviewed Opioid Use Disorder and Substance Use Disorder risk factors and made any needed referrals.

## 2024-02-06 ENCOUNTER — TELEPHONE (OUTPATIENT)
Dept: FAMILY MEDICINE | Facility: CLINIC | Age: 84
End: 2024-02-06
Payer: MEDICARE

## 2024-02-06 NOTE — TELEPHONE ENCOUNTER
Patient called the clinic and was wondering if she is needing to do any blood work soon. Informed patient that she just had blood work done in October and the results were WNL. Patient stated understanding and had no further questions.    Jessica CORTEZ RN  [facility] Triage Team

## 2024-04-11 ENCOUNTER — TRANSFERRED RECORDS (OUTPATIENT)
Dept: HEALTH INFORMATION MANAGEMENT | Facility: CLINIC | Age: 84
End: 2024-04-11

## 2024-04-25 ENCOUNTER — TRANSFERRED RECORDS (OUTPATIENT)
Dept: HEALTH INFORMATION MANAGEMENT | Facility: CLINIC | Age: 84
End: 2024-04-25
Payer: MEDICARE

## 2024-06-17 ENCOUNTER — OFFICE VISIT (OUTPATIENT)
Dept: FAMILY MEDICINE | Facility: CLINIC | Age: 84
End: 2024-06-17
Payer: MEDICARE

## 2024-06-17 VITALS
DIASTOLIC BLOOD PRESSURE: 81 MMHG | WEIGHT: 128.1 LBS | RESPIRATION RATE: 18 BRPM | SYSTOLIC BLOOD PRESSURE: 117 MMHG | HEIGHT: 60 IN | BODY MASS INDEX: 25.15 KG/M2 | OXYGEN SATURATION: 98 % | HEART RATE: 92 BPM | TEMPERATURE: 97.5 F

## 2024-06-17 DIAGNOSIS — E53.8 VITAMIN B12 DEFICIENCY (NON ANEMIC): Primary | ICD-10-CM

## 2024-06-17 DIAGNOSIS — E55.9 VITAMIN D DEFICIENCY: ICD-10-CM

## 2024-06-17 DIAGNOSIS — Q38.3 ABNORMALITY OF TONGUE: ICD-10-CM

## 2024-06-17 PROCEDURE — 99213 OFFICE O/P EST LOW 20 MIN: CPT | Performed by: INTERNAL MEDICINE

## 2024-06-17 ASSESSMENT — PAIN SCALES - GENERAL: PAINLEVEL: NO PAIN (0)

## 2024-06-17 NOTE — PROGRESS NOTES
This is an 83-year-old who I am seeing for follow-up to multiple issues.  As noted she has a complex cardiac history.  She was seen May 31 by cardiology and I reviewed that note.  She had TAVR done 2 years ago with follow-up echo showing normal bioprosthetic valve function.  She also has a history of coronary disease with prior stenting and this has been quite stable.      As noted she has a history of visual hallucinations with negative MRI and neurology evaluation.  I initially saw her for this on July 28 of last year.  It would be when she woke she would see something in front of her.  She never had it during the day only when she woke up.    The patient has hypertension for which I added amlodipine last July.  She is tolerated this quite well.    She has not had more visual hallucinations.  In [place] she was having some dizziness and some vertigo.  This is markedly improved.  They did labs in [place] and her B12 was on the low end of normal and she received 1 shot and was told to get monthly shots which she does not want to do.  She is taking vitamin B12.  Her vitamin D level is also slightly low.  She is taking vitamin D.    She really feels quite well.  She has noticed a salty taste as well as a burning on her tip of her tongue.  This started after COVID in the fall.    Past Medical History:   Diagnosis Date    ASCVD (arteriosclerotic cardiovascular disease) 01/01/2010    atyp cp then angio done [facility] and 4 stents; angio done 8/8/22 at [facility] for tavr and stent placed in circ    Elevated blood sugar     Elevated TSH 07/2023    Essential hypertension 07/2023    H/O colonoscopy 2005, 2013    nl; done [place] 4/19 and nl    Microhematuria 01/01/2014    Dr. Rico, cysto and renal us neg    Osteoporosis 01/01/2001    by dexa, fu done 2004 and unchanged, fu done [place]    Reflux esophagitis 01/01/2004    on ugi, then egd 2009 with hh and esophagitis; fu done 4/19 in [place]    Severe aortic stenosis     [facility]; tavr done 8/18/22    TIA (transient ischemic attack) 05/04/2022    [facility], ct head, cta head and neck neg, word finding difficulty and dizziness    Tremor     right hand for years    Visual hallucinations 2023    imaging neg, seen by neuro and no cause found    Vitamin B12 deficiency (non anemic) 2024    by labs done in [place]    Vitamin D deficiency 2024    by labs in [place]     Past Surgical History:   Procedure Laterality Date    CATARACT IOL, RT/LT  2016    COSMETIC SURGERY  1994    chin    EXTERNAL EAR SURGERY      young    left arm surgery Left 2014    MASTECTOMY SIMPLE BILATERAL  1982    done due to fh, had leakage and replaced 2004    TUBAL LIGATION  1972     Social History     Socioeconomic History    Marital status:      Spouse name: Not on file    Number of children: 3    Years of education: Not on file    Highest education level: Not on file   Occupational History    Not on file   Tobacco Use    Smoking status: Never    Smokeless tobacco: Never   Vaping Use    Vaping status: Never Used   Substance and Sexual Activity    Alcohol use: No     Alcohol/week: 0.0 standard drinks of alcohol    Drug use: No    Sexual activity: Yes     Partners: Male   Other Topics Concern    Not on file   Social History Narrative    Not on file     Social Determinants of Health     Financial Resource Strain: Low Risk  (10/30/2023)    Financial Resource Strain     Within the past 12 months, have you or your family members you live with been unable to get utilities (heat, electricity) when it was really needed?: No   Food Insecurity: Low Risk  (10/30/2023)    Food Insecurity     Within the past 12 months, did you worry that your food would run out before you got money to buy more?: No     Within the past 12 months, did the food you bought just not last and you didn t have money to get more?: No   Transportation Needs: Low Risk  (10/30/2023)    Transportation Needs     Within the past 12 months, has lack  of transportation kept you from medical appointments, getting your medicines, non-medical meetings or appointments, work, or from getting things that you need?: No   Physical Activity: Not on file   Stress: Not on file   Social Connections: Unknown (1/1/2022)    Received from [facility]    Social Connections     Frequency of Communication with Friends and Family: Not on file   Interpersonal Safety: Not on file   Housing Stability: Low Risk  (10/30/2023)    Housing Stability     Do you have housing? : Yes     Are you worried about losing your housing?: No     Current Outpatient Medications   Medication Sig Dispense Refill    amLODIPine (NORVASC) 5 MG tablet Take 1 tablet (5 mg) by mouth daily 90 tablet 3    aspirin 325 MG tablet Take 162 mg by mouth daily       azithromycin (ZITHROMAX) 500 MG tablet Take one pill one hour prior to dental work 1 tablet 4    metoprolol succinate ER (TOPROL XL) 50 MG 24 hr tablet Take 50 mg by mouth      omeprazole (PRILOSEC) 20 MG DR capsule TAKE 1 CAPSULE BY MOUTH  DAILY 90 capsule 0    rosuvastatin (CRESTOR) 5 MG tablet Take by mouth daily       Allergies   Allergen Reactions    Codeine Sulfate Nausea    Penicillin V Nausea and Vomiting    Sulfa Antibiotics Nausea and Vomiting     FAMILY HISTORY NOTED AND REVIEWED    REVIEW OF SYSTEMS: above    PHYSICAL EXAM    /81 (BP Location: Left arm, Patient Position: Sitting, Cuff Size: Adult Regular)   Pulse 92   Temp 97.5  F (36.4  C) (Oral)   Resp 18   Ht 1.524 m (5')   Wt 58.1 kg (128 lb 1.6 oz)   SpO2 98%   BMI 25.02 kg/m      Patient appears non toxic  Mouth exam is unremarkable.  I could see nothing on the tip of her tongue or elsewhere on her tongue.    ASSESSMENT:  Abnormal tongue sensation, suspect from COVID, no lesions.  She will come back in the fall and if it is not improved could see ENT  Low vitamin D, taking replacement  Low vitamin  B12, taking replacement orally, check levels in the fall  Hypertension, controlled    PLAN:  She has a physical in the fall.  Will check labs and evaluate her tongue further

## 2024-08-11 DIAGNOSIS — I10 ESSENTIAL HYPERTENSION: ICD-10-CM

## 2024-08-12 RX ORDER — AMLODIPINE BESYLATE 5 MG/1
5 TABLET ORAL DAILY
Qty: 90 TABLET | Refills: 3 | Status: SHIPPED | OUTPATIENT
Start: 2024-08-12

## 2024-10-07 ENCOUNTER — DOCUMENTATION ONLY (OUTPATIENT)
Dept: LAB | Facility: CLINIC | Age: 84
End: 2024-10-07

## 2024-10-07 DIAGNOSIS — Z00.00 MEDICARE ANNUAL WELLNESS VISIT, SUBSEQUENT: Primary | ICD-10-CM

## 2024-10-07 DIAGNOSIS — I10 ESSENTIAL HYPERTENSION: ICD-10-CM

## 2024-10-07 DIAGNOSIS — R73.9 ELEVATED BLOOD SUGAR: ICD-10-CM

## 2024-10-07 DIAGNOSIS — E55.9 VITAMIN D DEFICIENCY: ICD-10-CM

## 2024-10-07 DIAGNOSIS — E78.5 HYPERLIPIDEMIA LDL GOAL <100: ICD-10-CM

## 2024-10-07 DIAGNOSIS — E53.8 VITAMIN B12 DEFICIENCY (NON ANEMIC): ICD-10-CM

## 2024-10-07 NOTE — PROGRESS NOTES
Patient has an upcomming lab visit without orders. Please place orders as needed.    Patient requesting: None given, Has upcomming appointment with encounter provider    Appointment date: 10/17/2024

## 2024-10-15 ENCOUNTER — TRANSFERRED RECORDS (OUTPATIENT)
Dept: HEALTH INFORMATION MANAGEMENT | Facility: CLINIC | Age: 84
End: 2024-10-15
Payer: MEDICARE

## 2024-10-17 ENCOUNTER — LAB (OUTPATIENT)
Dept: LAB | Facility: CLINIC | Age: 84
End: 2024-10-17
Payer: MEDICARE

## 2024-10-17 DIAGNOSIS — E55.9 VITAMIN D DEFICIENCY: ICD-10-CM

## 2024-10-17 DIAGNOSIS — E53.8 VITAMIN B12 DEFICIENCY (NON ANEMIC): ICD-10-CM

## 2024-10-17 DIAGNOSIS — Z00.00 MEDICARE ANNUAL WELLNESS VISIT, SUBSEQUENT: ICD-10-CM

## 2024-10-17 DIAGNOSIS — E78.5 HYPERLIPIDEMIA LDL GOAL <100: ICD-10-CM

## 2024-10-17 DIAGNOSIS — R73.9 ELEVATED BLOOD SUGAR: ICD-10-CM

## 2024-10-17 DIAGNOSIS — I10 ESSENTIAL HYPERTENSION: ICD-10-CM

## 2024-10-17 LAB
ALBUMIN SERPL BCG-MCNC: 4.3 G/DL (ref 3.5–5.2)
ALP SERPL-CCNC: 95 U/L (ref 40–150)
ALT SERPL W P-5'-P-CCNC: 22 U/L (ref 0–50)
ANION GAP SERPL CALCULATED.3IONS-SCNC: 13 MMOL/L (ref 7–15)
AST SERPL W P-5'-P-CCNC: 31 U/L (ref 0–45)
BILIRUB SERPL-MCNC: 0.3 MG/DL
BUN SERPL-MCNC: 19.5 MG/DL (ref 8–23)
CALCIUM SERPL-MCNC: 9.7 MG/DL (ref 8.8–10.4)
CHLORIDE SERPL-SCNC: 108 MMOL/L (ref 98–107)
CHOLEST SERPL-MCNC: 114 MG/DL
CREAT SERPL-MCNC: 1.23 MG/DL (ref 0.51–0.95)
EGFRCR SERPLBLD CKD-EPI 2021: 44 ML/MIN/1.73M2
ERYTHROCYTE [DISTWIDTH] IN BLOOD BY AUTOMATED COUNT: 13.3 % (ref 10–15)
EST. AVERAGE GLUCOSE BLD GHB EST-MCNC: 117 MG/DL
FASTING STATUS PATIENT QL REPORTED: YES
FASTING STATUS PATIENT QL REPORTED: YES
GLUCOSE SERPL-MCNC: 116 MG/DL (ref 70–99)
HBA1C MFR BLD: 5.7 % (ref 0–5.6)
HCO3 SERPL-SCNC: 22 MMOL/L (ref 22–29)
HCT VFR BLD AUTO: 41.3 % (ref 35–47)
HDLC SERPL-MCNC: 53 MG/DL
HGB BLD-MCNC: 13.3 G/DL (ref 11.7–15.7)
LDLC SERPL CALC-MCNC: 48 MG/DL
MCH RBC QN AUTO: 31.7 PG (ref 26.5–33)
MCHC RBC AUTO-ENTMCNC: 32.2 G/DL (ref 31.5–36.5)
MCV RBC AUTO: 98 FL (ref 78–100)
NONHDLC SERPL-MCNC: 61 MG/DL
PLATELET # BLD AUTO: 198 10E3/UL (ref 150–450)
POTASSIUM SERPL-SCNC: 4.1 MMOL/L (ref 3.4–5.3)
PROT SERPL-MCNC: 7.1 G/DL (ref 6.4–8.3)
RBC # BLD AUTO: 4.2 10E6/UL (ref 3.8–5.2)
SODIUM SERPL-SCNC: 143 MMOL/L (ref 135–145)
TRIGL SERPL-MCNC: 67 MG/DL
TSH SERPL DL<=0.005 MIU/L-ACNC: 3.35 UIU/ML (ref 0.3–4.2)
VIT B12 SERPL-MCNC: 2165 PG/ML (ref 232–1245)
VIT D+METAB SERPL-MCNC: 45 NG/ML (ref 20–50)
WBC # BLD AUTO: 5.7 10E3/UL (ref 4–11)

## 2024-10-17 PROCEDURE — 82306 VITAMIN D 25 HYDROXY: CPT

## 2024-10-17 PROCEDURE — 84443 ASSAY THYROID STIM HORMONE: CPT

## 2024-10-17 PROCEDURE — 36415 COLL VENOUS BLD VENIPUNCTURE: CPT

## 2024-10-17 PROCEDURE — 85027 COMPLETE CBC AUTOMATED: CPT

## 2024-10-17 PROCEDURE — 80053 COMPREHEN METABOLIC PANEL: CPT

## 2024-10-17 PROCEDURE — 80061 LIPID PANEL: CPT

## 2024-10-17 PROCEDURE — 82607 VITAMIN B-12: CPT

## 2024-10-17 PROCEDURE — 83036 HEMOGLOBIN GLYCOSYLATED A1C: CPT

## 2024-10-31 ENCOUNTER — OFFICE VISIT (OUTPATIENT)
Dept: FAMILY MEDICINE | Facility: CLINIC | Age: 84
End: 2024-10-31
Payer: MEDICARE

## 2024-10-31 VITALS
RESPIRATION RATE: 16 BRPM | TEMPERATURE: 98 F | BODY MASS INDEX: 25.8 KG/M2 | DIASTOLIC BLOOD PRESSURE: 80 MMHG | SYSTOLIC BLOOD PRESSURE: 133 MMHG | HEIGHT: 59 IN | HEART RATE: 74 BPM | WEIGHT: 128 LBS | OXYGEN SATURATION: 96 %

## 2024-10-31 DIAGNOSIS — I25.10 ASCVD (ARTERIOSCLEROTIC CARDIOVASCULAR DISEASE): ICD-10-CM

## 2024-10-31 DIAGNOSIS — E78.5 HYPERLIPIDEMIA LDL GOAL <100: ICD-10-CM

## 2024-10-31 DIAGNOSIS — N18.31 STAGE 3A CHRONIC KIDNEY DISEASE (CKD) (H): Primary | ICD-10-CM

## 2024-10-31 DIAGNOSIS — K21.00 GASTROESOPHAGEAL REFLUX DISEASE WITH ESOPHAGITIS WITHOUT HEMORRHAGE: ICD-10-CM

## 2024-10-31 DIAGNOSIS — E55.9 VITAMIN D DEFICIENCY: ICD-10-CM

## 2024-10-31 DIAGNOSIS — R73.9 ELEVATED BLOOD SUGAR: ICD-10-CM

## 2024-10-31 DIAGNOSIS — Z00.00 ROUTINE GENERAL MEDICAL EXAMINATION AT A HEALTH CARE FACILITY: ICD-10-CM

## 2024-10-31 DIAGNOSIS — R79.89 ELEVATED TSH: ICD-10-CM

## 2024-10-31 DIAGNOSIS — E53.8 VITAMIN B12 DEFICIENCY (NON ANEMIC): ICD-10-CM

## 2024-10-31 DIAGNOSIS — I10 ESSENTIAL HYPERTENSION: ICD-10-CM

## 2024-10-31 DIAGNOSIS — I35.0 SEVERE AORTIC STENOSIS: ICD-10-CM

## 2024-10-31 DIAGNOSIS — M81.0 OSTEOPOROSIS, UNSPECIFIED OSTEOPOROSIS TYPE, UNSPECIFIED PATHOLOGICAL FRACTURE PRESENCE: ICD-10-CM

## 2024-10-31 LAB — HOLD SPECIMEN: NORMAL

## 2024-10-31 PROCEDURE — 99214 OFFICE O/P EST MOD 30 MIN: CPT | Performed by: INTERNAL MEDICINE

## 2024-10-31 RX ORDER — AMLODIPINE BESYLATE 5 MG/1
5 TABLET ORAL DAILY
Qty: 90 TABLET | Refills: 3 | Status: SHIPPED | OUTPATIENT
Start: 2024-10-31

## 2024-10-31 RX ORDER — ROSUVASTATIN CALCIUM 5 MG/1
5 TABLET, COATED ORAL DAILY
Qty: 90 TABLET | Refills: 3 | Status: SHIPPED | OUTPATIENT
Start: 2024-10-31

## 2024-10-31 RX ORDER — METOPROLOL SUCCINATE 50 MG/1
50 TABLET, EXTENDED RELEASE ORAL DAILY
Qty: 90 TABLET | Refills: 3 | Status: SHIPPED | OUTPATIENT
Start: 2024-10-31

## 2024-10-31 ASSESSMENT — PAIN SCALES - GENERAL: PAINLEVEL_OUTOF10: NO PAIN (0)

## 2024-10-31 NOTE — PROGRESS NOTES
This is a patient with multiple medical problems here with her  for follow-up.    Patient has a history of coronary disease.  She has no chest pain or shortness of breath.  She has osteoporosis which is managed by her gynecologist and according to the patient she has had bone density testing there.  She had a [facility] doctor who diagnosed her with vitamin D deficiency and B12 deficiency in [place] last winter.  She has been taking them and has noted her B12 level is quite high.  She has a history of valve disease and has regular follow-up with cardiology and has been stable.  Her sugars are slightly high and she has hyperlipidemia.  She otherwise is doing well. She has had no more visual hallucinations.  She has had some dizziness but it is actually better.  Some vertigo.  She otherwise is doing well.               Past Medical History:      Past Medical History:   Diagnosis Date    ASCVD (arteriosclerotic cardiovascular disease) 01/01/2010    atyp cp then angio done [facility] and 4 stents; angio done 8/8/22 at [facility] for tavr and stent placed in circ    Elevated blood sugar     Elevated TSH 07/2023    Essential hypertension 07/2023    H/O colonoscopy 2005, 2013    nl; done [place] 4/19 and nl    Microhematuria 01/01/2014    Dr. Rico, cysto and renal us neg    Osteoporosis 01/01/2001    by dexa, fu done 2004 and unchanged, fu done [place]    Reflux esophagitis 01/01/2004    on ugi, then egd 2009 with hh and esophagitis; fu done 4/19 in [place]    Severe aortic stenosis     [facility] heart clinic; tavr done 8/18/22    Stage 3a chronic kidney disease (CKD) (H) 2021    TIA (transient ischemic attack) 05/04/2022    seen [facility], ct head, cta head and neck neg, word finding difficulty and dizziness    Tremor     right hand for years    Visual hallucinations 2023    imaging neg, seen by neuro and no cause found    Vitamin B12 deficiency (non anemic) 2024    by labs done in [place]    Vitamin D deficiency 2024    by labs  in [place]             Past Surgical History:      Past Surgical History:   Procedure Laterality Date    CATARACT IOL, RT/LT  2016    COSMETIC SURGERY  1994    chin    EXTERNAL EAR SURGERY      young    left arm surgery Left 2014    MASTECTOMY SIMPLE BILATERAL  1982    done due to fh, had leakage and replaced 2004    TUBAL LIGATION  1972             Social History:     Social History     Socioeconomic History    Marital status:      Spouse name: Not on file    Number of children: 3    Years of education: Not on file    Highest education level: Not on file   Occupational History    Not on file   Tobacco Use    Smoking status: Never    Smokeless tobacco: Never   Vaping Use    Vaping status: Never Used   Substance and Sexual Activity    Alcohol use: No     Alcohol/week: 0.0 standard drinks of alcohol    Drug use: No    Sexual activity: Yes     Partners: Male   Other Topics Concern    Not on file   Social History Narrative    Not on file     Social Drivers of Health     Financial Resource Strain: Low Risk  (10/30/2023)    Financial Resource Strain     Within the past 12 months, have you or your family members you live with been unable to get utilities (heat, electricity) when it was really needed?: No   Food Insecurity: Low Risk  (10/30/2023)    Food Insecurity     Within the past 12 months, did you worry that your food would run out before you got money to buy more?: No     Within the past 12 months, did the food you bought just not last and you didn t have money to get more?: No   Transportation Needs: Low Risk  (10/30/2023)    Transportation Needs     Within the past 12 months, has lack of transportation kept you from medical appointments, getting your medicines, non-medical meetings or appointments, work, or from getting things that you need?: No   Physical Activity: Not on file   Stress: Not on file   Social Connections: Unknown (1/1/2022)    Received from [employer] [facility], [facility]    Social Connections     Frequency of Communication with Friends and Family: Not on file   Interpersonal Safety: Not on file   Housing Stability: Low Risk  (10/30/2023)    Housing Stability     Do you have housing? : Yes     Are you worried about losing your housing?: No             Family History:   reviewed         Allergies:     Allergies   Allergen Reactions    Codeine Sulfate Nausea    Penicillin V Nausea and Vomiting    Sulfa Antibiotics Nausea and Vomiting             Medications:     Current Outpatient Medications   Medication Sig Dispense Refill    amLODIPine (NORVASC) 5 MG tablet Take 1 tablet (5 mg) by mouth daily. 90 tablet 3    aspirin 325 MG tablet Take 162 mg by mouth daily       azithromycin (ZITHROMAX) 500 MG tablet Take one pill one hour prior to dental work 1 tablet 4    metoprolol succinate ER (TOPROL XL) 50 MG 24 hr tablet Take 1 tablet (50 mg) by mouth daily. 90 tablet 3    omeprazole (PRILOSEC) 20 MG DR capsule Take 1 capsule (20 mg) by mouth daily. 90 capsule 3    rosuvastatin (CRESTOR) 5 MG tablet Take 1 tablet (5 mg) by mouth daily. 90 tablet 3               Review of Systems:     The 10 point Review of Systems is negative other than noted in the HPI           Physical Exam:   Blood pressure 133/80, pulse 74, temperature 98  F (36.7  C), temperature source Temporal, resp. rate 16, height 1.499 m (4' 11\"), weight 58.1 kg (128 lb), SpO2 96%, not currently breastfeeding.    Exam:  Constitutional: healthy appearing, alert and in no distress  Heent: Normocephalic. Head without obvious masses or lesions. PERRLDC, EOMI. Mouth exam within normal limits: tongue, mucous membranes, posterior pharynx all normal, no lesions or abnormalities seen.  Tm's and canals within normal limits bilaterally. Neck supple, no nuchal rigidity or masses. No supraclavicular, or cervical adenopathy. Thyroid symmetric, no masses.  Cardiovascular: Regular rate and rhythm, no murmer, " rub or gallops.  JVP not elevated, no edema.  Carotids within normal limits bilaterally, no bruits.  Respiratory: Normal respiratory effort.  Lungs clear, normal flow, no wheezing or crackles.  Gastrointestinal: Normal active bowel sounds.   Soft, not tender, no masses, guarding or rebound.  No hepatosplenomegaly.   Musculoskeletal: extremities normal, no gross deformities noted.  Skin: no suspicious lesions or rashes   Neurologic: Mental status within normal limits.  Speech fluent.  No gross motor abnormalities and gait intact.  Psychiatric: mentation appears normal and affect normal.         Data:   Labs reviewed with patient, urine sent        Assessment:   CKD, stage IIIa, suspect due to hypertension as well as age, will monitor it, check urine microalbumin today  Vitamin D deficiency, replaced  Vitamin B12 deficiency, level high, she will stop it we can check it in the spring  Elevated TSH, fine now  Hypertension, good control  Aortic stenosis, had TAVR and doing well, follow-up with cardiology  Elevated sugar, exercise and diet  Elevated cholesterol, doing well on statin therapy  Coronary artery disease, stable, med management  Osteoporosis, calcium and vitamin D  Acid reflux, controlled  Healthcare maintenance         Plan:   Vaccines at pharmacy  Check urine today  Exercise and diet  Stop vitamin B12 and follow-up the spring  Up-to-date with gynecology      Boston Majano M.D.

## 2024-10-31 NOTE — PATIENT INSTRUCTIONS
Get the flu shot, covid shot and td shot at your pharmacy.    Stop the vitamin b12 and repeat the level in 6 months

## 2024-11-08 ENCOUNTER — APPOINTMENT (OUTPATIENT)
Dept: LAB | Facility: CLINIC | Age: 84
End: 2024-11-08
Payer: MEDICARE

## 2024-11-08 LAB
CREAT UR-MCNC: 177 MG/DL
MICROALBUMIN UR-MCNC: <12 MG/L
MICROALBUMIN/CREAT UR: NORMAL MG/G{CREAT}

## 2024-11-08 PROCEDURE — 82570 ASSAY OF URINE CREATININE: CPT | Performed by: INTERNAL MEDICINE

## 2024-11-08 PROCEDURE — 82043 UR ALBUMIN QUANTITATIVE: CPT | Performed by: INTERNAL MEDICINE

## 2024-11-10 DIAGNOSIS — I10 ESSENTIAL HYPERTENSION: ICD-10-CM

## 2024-11-11 RX ORDER — AMLODIPINE BESYLATE 5 MG/1
5 TABLET ORAL DAILY
Qty: 90 TABLET | Refills: 2 | OUTPATIENT
Start: 2024-11-11

## 2024-11-21 ENCOUNTER — TRANSFERRED RECORDS (OUTPATIENT)
Dept: HEALTH INFORMATION MANAGEMENT | Facility: CLINIC | Age: 84
End: 2024-11-21

## 2024-11-25 NOTE — TELEPHONE ENCOUNTER
[facility]   Physical Therapy Treatment Note     Name: Shakira Pearl  Clinic Number: [id number]    Therapy Diagnosis:   Encounter Diagnoses   Name Primary?    Impaired range of motion of both hips Yes    Weakness of both hips     Impaired ambulation     Impaired mobility and ADLs      Physician: Angel Bello, *    Visit Date: 11/25/2024       Physician Orders: PT Eval and Treat gait and balance   Medical Diagnosis from Referral:   Physical deconditioning [R53.81], Weakness of both lower extremities [R29.898]   Evaluation Date: 11/13/2024  Authorization Period Expiration: 10/10/2025  Plan of Care Expiration: 2/13/2025  Progress Note Due: 12/13/2024  Visit # / Visits authorized: 1/ 1,   2 / 20      BALANCE  #1/3 11/13/2024-1 48%   #2/3       #3/3              PTA Visit #: -/5     Precautions: Standard    Time In: 303  Time Out: 405  Total Billable Time: 62 minutes      SUBJECTIVE     Pt reports: there is no pain but just uncomfortable in the neck.  She says she is scheduled for an MRI of the spine and another test on Friday.     She was not issued a home exercise program at   Response to previous treatment: did not experienced any difficulty after thelast session.       Functional change: ongoing    Pain: 0/10  Location: bilateral lower legs and upper legs       OBJECTIVE     BP pre   124/76 ND 81  BP post   TREATMENT        Shakira received the treatments listed below:     .BOLD INDICATES ACTIVITIES PERFORMED / DISCUSSED     Leg press   Recumbent bike  Upright bike   UE ergometer  Treadmill   Elliptical          THERAPEUTIC EXERCISES to develop  strength, endurance, ROM, and flexibility for - minutes including   SUPINE  -  -    SIDELYING  -  -    PRONE  -  -    STANDING.  -    SITTING  -     NEUROMUSCULAR RE-EDUCATION activities to improve: Balance, Proprioception, motor control and stability  for - minutes. The following activities were included:  SUPINE  -SLR x20   If it is more urgent and needs office visit sooner please let me know    Boston Majano M.D.     3#   -knee to chest x20 3#  -bridge x20     SIDELYING  -hip abduction x15  NA   -hydrants x15 3#    PRONE  -leg lifts x20  NA  -leg swings x15 3#    STANDING.  -leg swings x15 abduction / extension   -heel raises x30  -table squats x20     -BALANCE  SL stand   Tandem stand   Wide base airex eyes closed   Narrow base airex  eyes open     SITTING  -LAQ x30   -SAQ x30     THERAPEUTIC ACTIVITIES to improve functional performance for -  minutes, including:  -     MANUAL THERAPY TECHNIQUES  were applied to - for - minutes.       MODALITY      PATIENT EDUCATION AND HOME EXERCISES     Home Exercises Provided and Patient Education Provided     Education provided:   --Findings of evaluation and examination, and affect of these on plan for treatment  -Prognosis and expectations  -Home exercise program and expectations of therapy     Written Home Exercises Provided: no  ASSESSMENT     Shakira completed all the activities as noted. She did not encounter any significant difficulty. Performed squats with good technique and without pain in the knees. Movements are not compromised. Progressing with CC activities.     Shakira Is progressing towards her goals.   Pt prognosis is Good.     Pt will continue to benefit from skilled outpatient physical therapy to address the deficits listed in the problem list box on initial evaluation, provide pt/family education and to maximize pt's level of independence in the home and community environment.     Pt's spiritual, cultural and educational needs considered and pt agreeable to plan of care and goals.     Anticipated barriers to physical therapy: none    Goals:   Ankle / Lower extremity  Short Term Goals: 4 weeks   1. Pts pain decreased 20 - 40% for improved functional mobility and quality of life ONGOING  2. Pts PROM in right knee extension increased by 5  degrees to enhance AROM and functional mobility  ONGOING  3. Pts strength in the gluteal musculature and right quadriceps increased by 1/2  grade to facilitate improved active mobility and functional stability ONGOING  4. Pt to exhibit correct return demonstration of exercises for self-management and independence with HEP ONGOING     Long Term Goals : 8 weeks   1.  Pts pain level decreased to 75% or greater for with standing  / walking for restoring functional mobility and ADL. ONGOING  2. Pts AROM in knee extension and hip abduction and extension increased by 10 degrees to restore functional mobility and ADL  ONGOING  3. Pts strength in the gluteal musculature and right quadriceps increased by one grade to facilitate improved active mobility and functional stability  ONGOING  4. Pt will be independent with HEP for self-management. ONGOING   5. Pt to exhibit dynamic stability on the RLE/ LLE for stable functional mobility and gait. ONGOING   6. Pt to demonstrate symmetrical weight bearing w/o pain to improve gait pattern  ONGOING     PLAN     Plan of Care Certification: 11/13/2024 to 2/13/2025.   Outpatient Physical Therapy 2 times weekly for 10 weeks to include the following interventions: Manual Therapy, Neuromuscular Re-ed, Patient Education, Therapeutic Activities, and Therapeutic Exercise.     Simone Lackey, PT

## 2025-04-16 DIAGNOSIS — I25.10 ASCVD (ARTERIOSCLEROTIC CARDIOVASCULAR DISEASE): ICD-10-CM

## 2025-04-16 RX ORDER — METOPROLOL SUCCINATE 50 MG/1
50 TABLET, EXTENDED RELEASE ORAL
Qty: 90 TABLET | Refills: 1 | Status: SHIPPED | OUTPATIENT
Start: 2025-04-16

## 2025-06-10 ENCOUNTER — DOCUMENTATION ONLY (OUTPATIENT)
Dept: LAB | Facility: CLINIC | Age: 85
End: 2025-06-10

## 2025-06-10 DIAGNOSIS — E78.5 HYPERLIPIDEMIA LDL GOAL <100: ICD-10-CM

## 2025-06-10 DIAGNOSIS — R73.9 ELEVATED BLOOD SUGAR: ICD-10-CM

## 2025-06-10 DIAGNOSIS — E53.8 VITAMIN B12 DEFICIENCY (NON ANEMIC): ICD-10-CM

## 2025-06-10 DIAGNOSIS — R79.89 ELEVATED TSH: ICD-10-CM

## 2025-06-10 DIAGNOSIS — N18.31 STAGE 3A CHRONIC KIDNEY DISEASE (CKD) (H): ICD-10-CM

## 2025-06-10 DIAGNOSIS — I25.10 ASCVD (ARTERIOSCLEROTIC CARDIOVASCULAR DISEASE): ICD-10-CM

## 2025-06-10 DIAGNOSIS — I10 ESSENTIAL HYPERTENSION: ICD-10-CM

## 2025-06-10 DIAGNOSIS — E55.9 VITAMIN D DEFICIENCY: ICD-10-CM

## 2025-06-10 DIAGNOSIS — Z00.00 MEDICARE ANNUAL WELLNESS VISIT, SUBSEQUENT: Primary | ICD-10-CM

## 2025-06-10 DIAGNOSIS — E03.8 SUBCLINICAL HYPOTHYROIDISM: ICD-10-CM

## 2025-06-18 ENCOUNTER — PATIENT OUTREACH (OUTPATIENT)
Dept: CARE COORDINATION | Facility: CLINIC | Age: 85
End: 2025-06-18
Payer: MEDICARE

## 2025-07-01 ENCOUNTER — LAB (OUTPATIENT)
Dept: LAB | Facility: CLINIC | Age: 85
End: 2025-07-01
Payer: MEDICARE

## 2025-07-01 DIAGNOSIS — I25.10 ASCVD (ARTERIOSCLEROTIC CARDIOVASCULAR DISEASE): ICD-10-CM

## 2025-07-01 DIAGNOSIS — E78.5 HYPERLIPIDEMIA LDL GOAL <100: ICD-10-CM

## 2025-07-01 DIAGNOSIS — Z00.00 MEDICARE ANNUAL WELLNESS VISIT, SUBSEQUENT: ICD-10-CM

## 2025-07-01 DIAGNOSIS — E53.8 VITAMIN B12 DEFICIENCY (NON ANEMIC): ICD-10-CM

## 2025-07-01 DIAGNOSIS — E03.8 SUBCLINICAL HYPOTHYROIDISM: ICD-10-CM

## 2025-07-01 DIAGNOSIS — N18.31 STAGE 3A CHRONIC KIDNEY DISEASE (CKD) (H): ICD-10-CM

## 2025-07-01 DIAGNOSIS — R79.89 ELEVATED TSH: ICD-10-CM

## 2025-07-01 DIAGNOSIS — E55.9 VITAMIN D DEFICIENCY: ICD-10-CM

## 2025-07-01 DIAGNOSIS — I10 ESSENTIAL HYPERTENSION: ICD-10-CM

## 2025-07-01 DIAGNOSIS — R73.9 ELEVATED BLOOD SUGAR: ICD-10-CM

## 2025-07-01 LAB
ALBUMIN SERPL BCG-MCNC: 4.3 G/DL (ref 3.5–5.2)
ALP SERPL-CCNC: 89 U/L (ref 40–150)
ALT SERPL W P-5'-P-CCNC: 18 U/L (ref 0–50)
ANION GAP SERPL CALCULATED.3IONS-SCNC: 10 MMOL/L (ref 7–15)
AST SERPL W P-5'-P-CCNC: 27 U/L (ref 0–45)
BILIRUB SERPL-MCNC: 0.4 MG/DL
BUN SERPL-MCNC: 18.2 MG/DL (ref 8–23)
CALCIUM SERPL-MCNC: 10 MG/DL (ref 8.8–10.4)
CHLORIDE SERPL-SCNC: 105 MMOL/L (ref 98–107)
CHOLEST SERPL-MCNC: 149 MG/DL
CREAT SERPL-MCNC: 1.04 MG/DL (ref 0.51–0.95)
EGFRCR SERPLBLD CKD-EPI 2021: 53 ML/MIN/1.73M2
ERYTHROCYTE [DISTWIDTH] IN BLOOD BY AUTOMATED COUNT: 13.3 % (ref 10–15)
EST. AVERAGE GLUCOSE BLD GHB EST-MCNC: 111 MG/DL
FASTING STATUS PATIENT QL REPORTED: YES
FASTING STATUS PATIENT QL REPORTED: YES
GLUCOSE SERPL-MCNC: 108 MG/DL (ref 70–99)
HBA1C MFR BLD: 5.5 % (ref 0–5.6)
HCO3 SERPL-SCNC: 25 MMOL/L (ref 22–29)
HCT VFR BLD AUTO: 41.4 % (ref 35–47)
HDLC SERPL-MCNC: 65 MG/DL
HGB BLD-MCNC: 13.4 G/DL (ref 11.7–15.7)
LDLC SERPL CALC-MCNC: 58 MG/DL
MCH RBC QN AUTO: 32.1 PG (ref 26.5–33)
MCHC RBC AUTO-ENTMCNC: 32.4 G/DL (ref 31.5–36.5)
MCV RBC AUTO: 99 FL (ref 78–100)
NONHDLC SERPL-MCNC: 84 MG/DL
PLATELET # BLD AUTO: 198 10E3/UL (ref 150–450)
POTASSIUM SERPL-SCNC: 4.1 MMOL/L (ref 3.4–5.3)
PROT SERPL-MCNC: 7.3 G/DL (ref 6.4–8.3)
RBC # BLD AUTO: 4.17 10E6/UL (ref 3.8–5.2)
SODIUM SERPL-SCNC: 140 MMOL/L (ref 135–145)
T4 FREE SERPL-MCNC: 1.08 NG/DL (ref 0.9–1.7)
TRIGL SERPL-MCNC: 129 MG/DL
TSH SERPL DL<=0.005 MIU/L-ACNC: 5.62 UIU/ML (ref 0.3–4.2)
VIT B12 SERPL-MCNC: 289 PG/ML (ref 232–1245)
VIT D+METAB SERPL-MCNC: 44 NG/ML (ref 20–50)
WBC # BLD AUTO: 5.4 10E3/UL (ref 4–11)

## 2025-07-01 PROCEDURE — 82306 VITAMIN D 25 HYDROXY: CPT

## 2025-07-01 PROCEDURE — 84443 ASSAY THYROID STIM HORMONE: CPT

## 2025-07-01 PROCEDURE — 80053 COMPREHEN METABOLIC PANEL: CPT

## 2025-07-01 PROCEDURE — 82607 VITAMIN B-12: CPT

## 2025-07-01 PROCEDURE — 36415 COLL VENOUS BLD VENIPUNCTURE: CPT

## 2025-07-01 PROCEDURE — 83036 HEMOGLOBIN GLYCOSYLATED A1C: CPT

## 2025-07-01 PROCEDURE — 84439 ASSAY OF FREE THYROXINE: CPT

## 2025-07-01 PROCEDURE — 80061 LIPID PANEL: CPT

## 2025-07-01 PROCEDURE — 85027 COMPLETE CBC AUTOMATED: CPT

## 2025-07-08 ENCOUNTER — OFFICE VISIT (OUTPATIENT)
Dept: FAMILY MEDICINE | Facility: CLINIC | Age: 85
End: 2025-07-08
Payer: MEDICARE

## 2025-07-08 VITALS
HEIGHT: 59 IN | BODY MASS INDEX: 24.8 KG/M2 | WEIGHT: 123 LBS | OXYGEN SATURATION: 100 % | RESPIRATION RATE: 16 BRPM | DIASTOLIC BLOOD PRESSURE: 67 MMHG | HEART RATE: 62 BPM | SYSTOLIC BLOOD PRESSURE: 133 MMHG | TEMPERATURE: 97.8 F

## 2025-07-08 DIAGNOSIS — M81.0 OSTEOPOROSIS, UNSPECIFIED OSTEOPOROSIS TYPE, UNSPECIFIED PATHOLOGICAL FRACTURE PRESENCE: ICD-10-CM

## 2025-07-08 DIAGNOSIS — K21.00 GASTROESOPHAGEAL REFLUX DISEASE WITH ESOPHAGITIS WITHOUT HEMORRHAGE: ICD-10-CM

## 2025-07-08 DIAGNOSIS — N18.31 STAGE 3A CHRONIC KIDNEY DISEASE (CKD) (H): ICD-10-CM

## 2025-07-08 DIAGNOSIS — I10 ESSENTIAL HYPERTENSION: ICD-10-CM

## 2025-07-08 DIAGNOSIS — E78.5 HYPERLIPIDEMIA LDL GOAL <100: ICD-10-CM

## 2025-07-08 DIAGNOSIS — I35.0 SEVERE AORTIC STENOSIS: ICD-10-CM

## 2025-07-08 DIAGNOSIS — R79.89 ELEVATED TSH: ICD-10-CM

## 2025-07-08 DIAGNOSIS — I25.10 ASCVD (ARTERIOSCLEROTIC CARDIOVASCULAR DISEASE): ICD-10-CM

## 2025-07-08 DIAGNOSIS — Z00.00 ENCOUNTER FOR MEDICARE ANNUAL WELLNESS EXAM: Primary | ICD-10-CM

## 2025-07-08 DIAGNOSIS — E55.9 VITAMIN D DEFICIENCY: ICD-10-CM

## 2025-07-08 DIAGNOSIS — E53.8 VITAMIN B12 DEFICIENCY (NON ANEMIC): ICD-10-CM

## 2025-07-08 DIAGNOSIS — R73.9 ELEVATED BLOOD SUGAR: ICD-10-CM

## 2025-07-08 PROBLEM — G45.9 TIA (TRANSIENT ISCHEMIC ATTACK): Status: RESOLVED | Noted: 2022-05-04 | Resolved: 2025-07-08

## 2025-07-08 LAB
CREAT UR-MCNC: 174 MG/DL
MICROALBUMIN UR-MCNC: <12 MG/L
MICROALBUMIN/CREAT UR: NORMAL MG/G{CREAT}

## 2025-07-08 PROCEDURE — G0439 PPPS, SUBSEQ VISIT: HCPCS | Performed by: INTERNAL MEDICINE

## 2025-07-08 PROCEDURE — 82043 UR ALBUMIN QUANTITATIVE: CPT | Performed by: INTERNAL MEDICINE

## 2025-07-08 PROCEDURE — 3075F SYST BP GE 130 - 139MM HG: CPT | Performed by: INTERNAL MEDICINE

## 2025-07-08 PROCEDURE — 3078F DIAST BP <80 MM HG: CPT | Performed by: INTERNAL MEDICINE

## 2025-07-08 PROCEDURE — 99214 OFFICE O/P EST MOD 30 MIN: CPT | Mod: 25 | Performed by: INTERNAL MEDICINE

## 2025-07-08 PROCEDURE — 82570 ASSAY OF URINE CREATININE: CPT | Performed by: INTERNAL MEDICINE

## 2025-07-08 PROCEDURE — 1126F AMNT PAIN NOTED NONE PRSNT: CPT | Performed by: INTERNAL MEDICINE

## 2025-07-08 RX ORDER — METOPROLOL SUCCINATE 50 MG/1
50 TABLET, EXTENDED RELEASE ORAL
Qty: 90 TABLET | Refills: 3 | Status: SHIPPED | OUTPATIENT
Start: 2025-07-08

## 2025-07-08 RX ORDER — AMLODIPINE BESYLATE 5 MG/1
5 TABLET ORAL DAILY
Qty: 90 TABLET | Refills: 3 | Status: SHIPPED | OUTPATIENT
Start: 2025-07-08

## 2025-07-08 RX ORDER — ROSUVASTATIN CALCIUM 5 MG/1
5 TABLET, COATED ORAL DAILY
Qty: 90 TABLET | Refills: 3 | Status: SHIPPED | OUTPATIENT
Start: 2025-07-08

## 2025-07-08 RX ORDER — OMEPRAZOLE 20 MG/1
20 CAPSULE, DELAYED RELEASE ORAL DAILY
Qty: 90 CAPSULE | Refills: 3 | Status: SHIPPED | OUTPATIENT
Start: 2025-07-08

## 2025-07-08 SDOH — HEALTH STABILITY: PHYSICAL HEALTH: ON AVERAGE, HOW MANY MINUTES DO YOU ENGAGE IN EXERCISE AT THIS LEVEL?: PATIENT DECLINED

## 2025-07-08 SDOH — HEALTH STABILITY: PHYSICAL HEALTH
ON AVERAGE, HOW MANY DAYS PER WEEK DO YOU ENGAGE IN MODERATE TO STRENUOUS EXERCISE (LIKE A BRISK WALK)?: PATIENT DECLINED

## 2025-07-08 ASSESSMENT — SOCIAL DETERMINANTS OF HEALTH (SDOH)
HOW OFTEN DO YOU GET TOGETHER WITH FRIENDS OR RELATIVES?: MORE THAN THREE TIMES A WEEK
HOW OFTEN DO YOU GET TOGETHER WITH FRIENDS OR RELATIVES?: MORE THAN THREE TIMES A WEEK

## 2025-07-08 ASSESSMENT — PAIN SCALES - GENERAL: PAINLEVEL_OUTOF10: NO PAIN (0)

## 2025-07-08 NOTE — PATIENT INSTRUCTIONS
If you have not had the prevnar 20 pneumonia shot I would get it.Patient Education   Preventive Care Advice   This is general advice given by our system to help you stay healthy. However, your care team may have specific advice just for you. Please talk to your care team about your preventive care needs.  Nutrition  Eat 5 or more servings of fruits and vegetables each day.  Try wheat bread, brown rice and whole grain pasta (instead of white bread, rice, and pasta).  Get enough calcium and vitamin D. Check the label on foods and aim for 100% of the RDA (recommended daily allowance).  Lifestyle  Exercise at least 150 minutes each week  (30 minutes a day, 5 days a week).  Do muscle strengthening activities 2 days a week. These help control your weight and prevent disease.  No smoking.  Wear sunscreen to prevent skin cancer.  Have a dental exam and cleaning every 6 months.  Yearly exams  See your health care team every year to talk about:  Any changes in your health.  Any medicines your care team has prescribed.  Preventive care, family planning, and ways to prevent chronic diseases.  Shots (vaccines)   HPV shots (up to age 26), if you've never had them before.  Hepatitis B shots (up to age 59), if you've never had them before.  COVID-19 shot: Get this shot when it's due.  Flu shot: Get a flu shot every year.  Tetanus shot: Get a tetanus shot every 10 years.  Pneumococcal, hepatitis A, and RSV shots: Ask your care team if you need these based on your risk.  Shingles shot (for age 50 and up)  General health tests  Diabetes screening:  Starting at age 35, Get screened for diabetes at least every 3 years.  If you are younger than age 35, ask your care team if you should be screened for diabetes.  Cholesterol test: At age 39, start having a cholesterol test every 5 years, or more often if advised.  Bone density scan (DEXA): At age 50, ask your care team if you should have this scan for osteoporosis (brittle  bones).  Hepatitis C: Get tested at least once in your life.  STIs (sexually transmitted infections)  Before age 24: Ask your care team if you should be screened for STIs.  After age 24: Get screened for STIs if you're at risk. You are at risk for STIs (including HIV) if:  You are sexually active with more than one person.  You don't use condoms every time.  You or a partner was diagnosed with a sexually transmitted infection.  If you are at risk for HIV, ask about PrEP medicine to prevent HIV.  Get tested for HIV at least once in your life, whether you are at risk for HIV or not.  Cancer screening tests  Cervical cancer screening: If you have a cervix, begin getting regular cervical cancer screening tests starting at age 21.  Breast cancer scan (mammogram): If you've ever had breasts, begin having regular mammograms starting at age 40. This is a scan to check for breast cancer.  Colon cancer screening: It is important to start screening for colon cancer at age 45.  Have a colonoscopy test every 10 years (or more often if you're at risk) Or, ask your provider about stool tests like a FIT test every year or Cologuard test every 3 years.  To learn more about your testing options, visit:   .  For help making a decision, visit:   https://bit.ly/hk81466.  Prostate cancer screening test: If you have a prostate, ask your care team if a prostate cancer screening test (PSA) at age 55 is right for you.  Lung cancer screening: If you are a current or former smoker ages 50 to 80, ask your care team if ongoing lung cancer screenings are right for you.  For informational purposes only. Not to replace the advice of your health care provider. Copyright   2023 [employer]. All rights reserved. Clinically reviewed by the [facility]. [employer] [id number] - REV 01/24.  Substance Use Disorder: Care Instructions  Overview     You can improve your life and health by stopping your use of alcohol or  drugs. When you don't drink or use drugs, you may feel and sleep better. You may get along better with your family, friends, and coworkers. There are medicines and programs that can help with substance use disorder.  How can you care for yourself at home?  Here are some ways to help you stay sober and prevent relapse.  If you have been given medicine to help keep you sober or reduce your cravings, be sure to take it exactly as prescribed.  Talk to your doctor about programs that can help you stop using drugs or drinking alcohol.  Do not keep alcohol or drugs in your home.  Plan ahead. Think about what you'll say if other people ask you to drink or use drugs. Try not to spend time with people who drink or use drugs.  Use the time and money spent on drinking or drugs to do something that's important to you.  Preventing a relapse  Have a plan to deal with relapse. Learn to recognize changes in your thinking that lead you to drink or use drugs. Get help before you start to drink or use drugs again.  Try to stay away from situations, friends, or places that may lead you to drink or use drugs.  If you feel the need to drink alcohol or use drugs again, seek help right away. Call a trusted friend or family member. Some people get support from organizations such as Narcotics Anonymous or evocatal or from treatment facilities.  If you relapse, get help as soon as you can. Some people make a plan with another person that outlines what they want that person to do for them if they relapse. The plan usually includes how to handle the relapse and who to notify in case of relapse.  Don't give up. Remember that a relapse doesn't mean that you have failed. Use the experience to learn the triggers that lead you to drink or use drugs. Then quit again. Recovery is a lifelong process. Many people have several relapses before they are able to quit for good.  Follow-up care is a key part of your treatment and safety. Be sure to make  "and go to all appointments, and call your doctor if you are having problems. It's also a good idea to know your test results and keep a list of the medicines you take.  When should you call for help?   Call 911  anytime you think you may need emergency care. For example, call if you or someone else:    Has overdosed or has withdrawal signs. Be sure to tell the emergency workers that you are or someone else is using or trying to quit using drugs. Overdose or withdrawal signs may include:  Losing consciousness.  Seizure.  Seeing or hearing things that aren't there (hallucinations).     Is thinking or talking about suicide or harming others.   Where to get help 24 hours a day, 7 days a week   If you or someone you know talks about suicide, self-harm, a mental health crisis, a substance use crisis, or any other kind of emotional distress, get help right away. You can:    Call the Suicide and Crisis Lifeline at 708.     Call 1-535-200-TALK (1-881.759.8808).     Text HOME to 564962 to access the Crisis Text Line.   Consider saving these numbers in your phone.  Go to [employer] for more information or to chat online.  Call your doctor now or seek immediate medical care if:    You are having withdrawal symptoms. These may include nausea or vomiting, sweating, shakiness, and anxiety.   Watch closely for changes in your health, and be sure to contact your doctor if:    You have a relapse.     You need more help or support to stop.   Where can you learn more?  Go to https://www.Boardvote.net/patiented  Enter H573 in the search box to learn more about \"Substance Use Disorder: Care Instructions.\"  Current as of: August 20, 2024  Content Version: 14.5 2024-2025 [employer].   Care instructions adapted under license by your healthcare professional. If you have questions about a medical condition or this instruction, always ask your healthcare professional. [employer] disclaims any warranty or " liability for your use of this information.

## 2025-07-08 NOTE — PROGRESS NOTES
Preventive Care Visit  [facility] SAUNDRA Majano MD, Internal Medicine  Jul 8, 2025          Jhonathan Mac is a 85 year old, presenting for the following:    She presents with her  and overall is doing well.  She does exercise by walking.  She has a doctor in [place].  She recently saw cardiology up here and had an echo.  Her TAVR is stable.  She is planning on an upcoming stress test as ordered by cardiology.  Her reflux is controlled.  She has follow-up with gynecology as well as regarding her bone density testing.               Past Medical History:      Past Medical History:   Diagnosis Date    ASCVD (arteriosclerotic cardiovascular disease) 01/01/2010    atyp cp then angio done anw and 4 stents; angio done 8/8/22 at [facility] for tavr and stent placed in circ    Elevated blood sugar     Elevated TSH 07/2023    Essential hypertension 07/2023    H/O colonoscopy 2005, 2013    nl; done [place] 4/19 and nl    Microhematuria 01/01/2014    Dr. Rico, cysto and renal us neg    Osteoporosis 01/01/2001    by dexa, fu done 2004 and unchanged, fu done [place]    Reflux esophagitis 01/01/2004    on ugi, then egd 2009 with hh and esophagitis; fu done 4/19 in [place]    Severe aortic stenosis     [facility] heart clinic; tavr done 8/18/22    Stage 3a chronic kidney disease (CKD) (H) 2021    TIA (transient ischemic attack) 05/04/2022    seen [facility], ct head, cta head and neck neg, word finding difficulty and dizziness    Tremor     right hand for years    Visual hallucinations 2023    imaging neg, seen by neuro and no cause found    Vitamin B12 deficiency (non anemic) 2024    by labs done in [place]    Vitamin D deficiency 2024    by labs in [place]             Past Surgical History:      Past Surgical History:   Procedure Laterality Date    CATARACT IOL, RT/LT  2016    COSMETIC SURGERY  1994    chin    EXTERNAL EAR SURGERY      young    left arm surgery Left 2014    MASTECTOMY SIMPLE BILATERAL   1982    done due to fh, had leakage and replaced 2004    TUBAL LIGATION  1972             Social History:     Social History     Socioeconomic History    Marital status:      Spouse name: Not on file    Number of children: 3    Years of education: Not on file    Highest education level: Not on file   Occupational History    Not on file   Tobacco Use    Smoking status: Never    Smokeless tobacco: Never   Vaping Use    Vaping status: Never Used   Substance and Sexual Activity    Alcohol use: No     Alcohol/week: 0.0 standard drinks of alcohol    Drug use: No    Sexual activity: Yes     Partners: Male   Other Topics Concern    Not on file   Social History Narrative    Not on file     Social Drivers of Health     Financial Resource Strain: Low Risk  (7/8/2025)    Financial Resource Strain     Within the past 12 months, have you or your family members you live with been unable to get utilities (heat, electricity) when it was really needed?: No   Food Insecurity: Low Risk  (7/8/2025)    Food Insecurity     Within the past 12 months, did you worry that your food would run out before you got money to buy more?: No     Within the past 12 months, did the food you bought just not last and you didn t have money to get more?: No   Transportation Needs: Low Risk  (7/8/2025)    Transportation Needs     Within the past 12 months, has lack of transportation kept you from medical appointments, getting your medicines, non-medical meetings or appointments, work, or from getting things that you need?: No   Physical Activity: Patient Declined (7/8/2025)    Exercise Vital Sign     Days of Exercise per Week: Patient declined     Minutes of Exercise per Session: Patient declined   Stress: No Stress Concern Present (7/8/2025)    [employer] - Occupational Stress Questionnaire     Feeling of Stress : Not at all   Social Connections: Unknown (7/8/2025)    Social Connection and Isolation Panel [NHANES]      "Frequency of Communication with Friends and Family: Not on file     Frequency of Social Gatherings with Friends and Family: More than three times a week     Attends Taoist Services: Not on file     Active Member of Clubs or Organizations: Not on file     Attends Club or Organization Meetings: Not on file     Marital Status: Not on file   Interpersonal Safety: Not on file   Housing Stability: Low Risk  (7/8/2025)    Housing Stability     Do you have housing? : Yes     Are you worried about losing your housing?: No             Family History:   reviewed         Allergies:     Allergies   Allergen Reactions    Codeine Sulfate Nausea    Penicillin V Nausea and Vomiting    Sulfa Antibiotics Nausea and Vomiting             Medications:     Current Outpatient Medications   Medication Sig Dispense Refill    amLODIPine (NORVASC) 5 MG tablet Take 1 tablet (5 mg) by mouth daily. 90 tablet 3    aspirin 325 MG tablet Take 162 mg by mouth daily       azithromycin (ZITHROMAX) 500 MG tablet Take one pill one hour prior to dental work 1 tablet 4    metoprolol succinate ER (TOPROL XL) 50 MG 24 hr tablet Take 1 tablet (50 mg) by mouth daily at 2 pm. 90 tablet 3    omeprazole (PRILOSEC) 20 MG DR capsule Take 1 capsule (20 mg) by mouth daily. 90 capsule 3    rosuvastatin (CRESTOR) 5 MG tablet Take 1 tablet (5 mg) by mouth daily. 90 tablet 3               Review of Systems:     The 10 point Review of Systems is negative other than noted in the HPI           Physical Exam:   Blood pressure 133/67, pulse 62, temperature 97.8  F (36.6  C), temperature source Temporal, resp. rate 16, height 1.499 m (4' 11\"), weight 55.8 kg (123 lb), SpO2 100%, not currently breastfeeding.    Exam:  Constitutional: healthy appearing, alert and in no distress  Heent: Normocephalic. Head without obvious masses or lesions. PERRLDC, EOMI. Mouth exam within normal limits: tongue, mucous membranes, posterior pharynx all normal, no lesions or abnormalities seen.  " Tm's and canals within normal limits bilaterally. Neck supple, no nuchal rigidity or masses. No supraclavicular, or cervical adenopathy. Thyroid symmetric, no masses.  Cardiovascular: Regular rate and rhythm, no murmer, rub or gallops.  JVP not elevated, no edema.  Carotids within normal limits bilaterally, no bruits.  Respiratory: Normal respiratory effort.  Lungs clear, normal flow, no wheezing or crackles.  Gastrointestinal: Normal active bowel sounds.   Soft, not tender, no masses, guarding or rebound.  No hepatosplenomegaly.   Musculoskeletal: extremities normal, no gross deformities noted.  Skin: no suspicious lesions or rashes   Neurologic: Mental status within normal limits.  Speech fluent.  No gross motor abnormalities and gait intact.  Psychiatric: mentation appears normal and affect normal.         Data:   Labs reviewed with patient, urine microalbumin to be done        Assessment:   Normal complete physical exam  CKD, stage IIIa, blood pressure controlled, check labs today for urine microalbumin  Coronary artery disease, stable, med management, follow-up per cardiology  Hyperlipidemia, LDL at goal  Elevated sugar, exercise and diet  Aortic stenosis, status post TAVR and doing well  Hypertension, controlled  Elevated TSH, monitor it  Vitamin B12 deficiency, to take oral  Vitamin D deficiency, on oral  Osteoporosis, follow-up gynecology  Reflux, controlled  Healthcare        Plan:   Exercise and diet  Follow-up specialist  Call if problems  She like to follow-up with me in November prior to leaving  Check urine microalbumin      Boston Majano M.D.      Appropriate preventive services were discussed with this patient via screening, questionairrere, or discussion as appropriate for fall prevention, nutrition, physical activity, social engagement, weight loss and cognition.  Check list reviewing preventive services available has been given to the patient.  Reviewed patient's diet, addressing concerns and  questions as appropriate.  Advice given for smoking sensation when appropriate.      No chief complaint on file.           HPI           Advance Care Planning    Patient states has Health Care Directive and will send to [employer].        7/8/2025   General Health   How would you rate your overall physical health? (!) DECLINE   Feel stress (tense, anxious, or unable to sleep) Not at all         7/8/2025   Nutrition   Diet: I don't know         7/8/2025   Exercise   Days per week of moderate/strenous exercise Patient declined   Average minutes spent exercising at this level Patient declined         7/8/2025   Social Factors   Frequency of gathering with friends or relatives More than three times a week   Worry food won't last until get money to buy more No   Food not last or not have enough money for food? No   Do you have housing? (Housing is defined as stable permanent housing and does not include staying outside in a car, in a tent, in an abandoned building, in an overnight shelter, or couch-surfing.) Yes   Are you worried about losing your housing? No   Lack of transportation? No   Unable to get utilities (heat,electricity)? No         7/8/2025   Fall Risk   Fallen 2 or more times in the past year? No     No   Trouble with walking or balance? No     No       Proxy-reported    Multiple values from one day are sorted in reverse-chronological order          7/8/2025   Activities of Daily Living- Home Safety   Needs help with the following daily activites None of the above   Safety concerns in the home None of the above         7/8/2025   Dental   Dentist two times every year? (!) NO         7/8/2025   Hearing Screening   Hearing concerns? None of the above         7/8/2025   Driving Risk Screening   Patient/family members have concerns about driving No         7/8/2025   General Alertness/Fatigue Screening   Have you been more tired than usual lately? (!) DECLINE         7/8/2025   Urinary Incontinence  "Screening   Bothered by leaking urine in past 6 months No               7/8/2025   Substance Use   Alcohol more than 3/day or more than 7/wk No   Do you have a current opioid prescription? No   How severe/bad is pain from 1 to 10? 0/10 (No Pain)   Do you use any other substances recreationally? (!) PRESCRIPTION DRUGS     Social History     Tobacco Use    Smoking status: Never    Smokeless tobacco: Never   Vaping Use    Vaping status: Never Used   Substance Use Topics    Alcohol use: No     Alcohol/week: 0.0 standard drinks of alcohol    Drug use: No                        Reviewed and updated as needed this visit by Provider                      Current providers sharing in care for this patient include:  Patient Care Team:  Boston Majano MD as PCP - General (Internal Medicine)  Boston Majano MD as Assigned PCP    The following health maintenance items are reviewed in Epic and correct as of today:  Health Maintenance   Topic Date Due    DEXA  Never done    ANNUAL REVIEW OF HM ORDERS  06/24/2023    DTAP/TDAP/TD VACCINE (2 - Td or Tdap) 11/12/2023    PHQ-2 (once per calendar year)  01/01/2025    COVID-19 VACCINE (8 - 2024-25 season) 05/03/2025    MEDICARE ANNUAL WELLNESS VISIT  06/27/2025    INFLUENZA VACCINE (1) 09/01/2025    MICROALBUMIN  11/08/2025    BMP  07/01/2026    LIPID  07/01/2026    HEMOGLOBIN  07/01/2026    FALL RISK ASSESSMENT  07/08/2026    ADVANCE CARE PLANNING  10/30/2028    PNEUMOCOCCAL VACCINE 50+ YEARS  Completed    URINALYSIS  Completed    ZOSTER VACCINE  Completed    RSV VACCINE  Completed    HPV VACCINE  Aged Out    MENINGITIS VACCINE  Aged Out            Objective    Exam  There were no vitals taken for this visit.   Estimated body mass index is 25.85 kg/m  as calculated from the following:    Height as of 10/31/24: 1.499 m (4' 11\").    Weight as of 10/31/24: 58.1 kg (128 lb).    Physical Exam          7/8/2025   Mini Cog   Clock Draw Score 2 Normal   3 Item Recall 3 objects " recalled   Mini Cog Total Score 5              Signed Electronically by: Boston Majano MD